# Patient Record
Sex: FEMALE | Race: WHITE | Employment: FULL TIME | URBAN - NONMETROPOLITAN AREA
[De-identification: names, ages, dates, MRNs, and addresses within clinical notes are randomized per-mention and may not be internally consistent; named-entity substitution may affect disease eponyms.]

---

## 2017-07-25 ENCOUNTER — HOSPITAL ENCOUNTER (EMERGENCY)
Age: 42
Discharge: HOME | End: 2017-07-25
Payer: COMMERCIAL

## 2017-07-25 VITALS
HEART RATE: 98 BPM | TEMPERATURE: 98.1 F | SYSTOLIC BLOOD PRESSURE: 127 MMHG | OXYGEN SATURATION: 98 % | BODY MASS INDEX: 32.96 KG/M2 | HEIGHT: 63 IN | DIASTOLIC BLOOD PRESSURE: 90 MMHG | RESPIRATION RATE: 22 BRPM | WEIGHT: 186 LBS

## 2017-07-25 DIAGNOSIS — S60.459A FOREIGN BODY IN SKIN OF FINGER, INITIAL ENCOUNTER: Primary | ICD-10-CM

## 2017-07-25 PROCEDURE — 99213 OFFICE O/P EST LOW 20 MIN: CPT | Performed by: NURSE PRACTITIONER

## 2017-07-25 PROCEDURE — 90471 IMMUNIZATION ADMIN: CPT | Performed by: NURSE PRACTITIONER

## 2017-07-25 PROCEDURE — 6360000002 HC RX W HCPCS: Performed by: NURSE PRACTITIONER

## 2017-07-25 PROCEDURE — 90715 TDAP VACCINE 7 YRS/> IM: CPT | Performed by: NURSE PRACTITIONER

## 2017-07-25 PROCEDURE — 99212 OFFICE O/P EST SF 10 MIN: CPT

## 2017-07-25 RX ADMIN — TETANUS TOXOID, REDUCED DIPHTHERIA TOXOID AND ACELLULAR PERTUSSIS VACCINE, ADSORBED 0.5 ML: 5; 2.5; 8; 8; 2.5 SUSPENSION INTRAMUSCULAR at 19:17

## 2017-07-25 ASSESSMENT — ENCOUNTER SYMPTOMS
ABDOMINAL PAIN: 0
BACK PAIN: 0
WHEEZING: 0
SORE THROAT: 0
COUGH: 0
SINUS PRESSURE: 0
COLOR CHANGE: 0
CHEST TIGHTNESS: 0
NAUSEA: 0
SHORTNESS OF BREATH: 0
RHINORRHEA: 0
VOMITING: 0

## 2021-03-24 ENCOUNTER — IMMUNIZATION (OUTPATIENT)
Dept: PRIMARY CARE CLINIC | Age: 46
End: 2021-03-24
Payer: COMMERCIAL

## 2021-03-24 PROCEDURE — 0001A PR IMM ADMN SARSCOV2 30MCG/0.3ML DIL RECON 1ST DOSE: CPT | Performed by: FAMILY MEDICINE

## 2021-03-24 PROCEDURE — 91300 COVID-19, PFIZER VACCINE 30MCG/0.3ML DOSE: CPT | Performed by: FAMILY MEDICINE

## 2021-04-14 ENCOUNTER — IMMUNIZATION (OUTPATIENT)
Dept: PRIMARY CARE CLINIC | Age: 46
End: 2021-04-14
Payer: COMMERCIAL

## 2021-04-14 PROCEDURE — 91300 COVID-19, PFIZER VACCINE 30MCG/0.3ML DOSE: CPT | Performed by: PHARMACIST

## 2021-04-14 PROCEDURE — 0002A COVID-19, PFIZER VACCINE 30MCG/0.3ML DOSE: CPT | Performed by: PHARMACIST

## 2021-11-10 ENCOUNTER — HOSPITAL ENCOUNTER (EMERGENCY)
Age: 46
Discharge: HOME | End: 2021-11-10
Payer: COMMERCIAL

## 2021-11-10 VITALS
HEART RATE: 84 BPM | RESPIRATION RATE: 16 BRPM | HEIGHT: 63 IN | TEMPERATURE: 96.8 F | BODY MASS INDEX: 36.32 KG/M2 | OXYGEN SATURATION: 100 % | WEIGHT: 205 LBS | DIASTOLIC BLOOD PRESSURE: 84 MMHG | SYSTOLIC BLOOD PRESSURE: 139 MMHG

## 2021-11-10 DIAGNOSIS — S39.012A LUMBOSACRAL STRAIN, INITIAL ENCOUNTER: Primary | ICD-10-CM

## 2021-11-10 PROCEDURE — 99202 OFFICE O/P NEW SF 15 MIN: CPT | Performed by: NURSE PRACTITIONER

## 2021-11-10 PROCEDURE — 99203 OFFICE O/P NEW LOW 30 MIN: CPT

## 2021-11-10 RX ORDER — METHYLPREDNISOLONE 4 MG/1
TABLET ORAL
Qty: 1 KIT | Refills: 0 | Status: SHIPPED | OUTPATIENT
Start: 2021-11-10 | End: 2021-11-16

## 2021-11-10 RX ORDER — CYCLOBENZAPRINE HCL 5 MG
5 TABLET ORAL 3 TIMES DAILY PRN
Qty: 21 TABLET | Refills: 0 | Status: SHIPPED | OUTPATIENT
Start: 2021-11-10 | End: 2021-11-20

## 2021-11-10 RX ORDER — NAPROXEN SODIUM 220 MG
220 TABLET ORAL 2 TIMES DAILY WITH MEALS
Qty: 60 TABLET | Refills: 3 | COMMUNITY
Start: 2021-11-10

## 2021-11-10 ASSESSMENT — ENCOUNTER SYMPTOMS
SHORTNESS OF BREATH: 0
BACK PAIN: 1

## 2021-11-10 ASSESSMENT — PAIN DESCRIPTION - ORIENTATION: ORIENTATION: LOWER

## 2021-11-10 ASSESSMENT — PAIN - FUNCTIONAL ASSESSMENT: PAIN_FUNCTIONAL_ASSESSMENT: PREVENTS OR INTERFERES SOME ACTIVE ACTIVITIES AND ADLS

## 2021-11-10 ASSESSMENT — PAIN DESCRIPTION - DESCRIPTORS: DESCRIPTORS: ACHING;SHARP

## 2021-11-10 ASSESSMENT — PAIN DESCRIPTION - LOCATION: LOCATION: BACK

## 2021-11-10 ASSESSMENT — PAIN SCALES - GENERAL: PAINLEVEL_OUTOF10: 8

## 2021-11-10 ASSESSMENT — PAIN DESCRIPTION - PAIN TYPE: TYPE: CHRONIC PAIN

## 2021-11-10 NOTE — ED TRIAGE NOTES
Patient ambulated to room with complaint of chronic lower back pain that flared up a couple of days ago.  States she was referred to pain management but has not scheduled with them yet

## 2021-11-10 NOTE — ED PROVIDER NOTES
[facility]       CHIEF COMPLAINT       Chief Complaint   Patient presents with    Back Pain     chronic lower back pain       Nurses Notes reviewed and I agree except as noted in the HPI. HISTORY OF PRESENT ILLNESS   Hieu Nicole is a 48 y.o. female who presents with complaints of lower back discomfort. Patient states this started a few days ago. She admits to a history of chronic lower back pain. She previously has seen a orthopedic specialist but was unhappy with his care. She was referred to pain management but has not followed. She denies any numbness or tingling or radiation of pain. She complains of severe discomfort across the top of her buttocks. She denies any urination or bowel problems. The history is provided by the patient. REVIEW OF SYSTEMS     Review of Systems   Constitutional: Negative for activity change. Respiratory: Negative for shortness of breath. Cardiovascular: Negative for chest pain. Genitourinary: Negative for difficulty urinating. Musculoskeletal: Positive for back pain and myalgias. Negative for arthralgias and gait problem. Skin: Negative for wound. Neurological: Negative for weakness and numbness. PAST MEDICAL HISTORY         Diagnosis Date    ADD (attention deficit disorder)     Depression     Fibromyalgia     Headache(784.0)     Hyperlipidemia     Hypertension     Obesity        SURGICALHISTORY     Patient  has a past surgical history that includes Hysterectomy. CURRENT MEDICATIONS       Discharge Medication List as of 11/10/2021 11:15 AM      CONTINUE these medications which have NOT CHANGED    Details   Topiramate (TOPAMAX PO) Take by mouthHistorical Med      NONFORMULARY Indications: cholesterol medication Historical Med      STRATTERA 40 MG capsule TAKE 1 CAPSULE DAILY, Disp-90 capsule, R-0Normal      hydrochlorothiazide (HYDRODIURIL) 12.5 MG tablet Take 1 tablet by mouth daily. , Disp-90 tablet, R-3Print      PREMARIN 1.25 MG tablet TAKE 1 TABLET BY MOUTH DAILY, Disp-90 tablet, R-3Normal      amitriptyline (ELAVIL) 25 MG tablet Take 1 tablet by mouth nightly., Disp-90 tablet, R-3Normal      Nutritional Supplements (MELATONIN PO) Take  by mouth nightly. Historical Med      albuterol (PROVENTIL HFA;VENTOLIN HFA) 108 (90 BASE) MCG/ACT inhaler Inhale 1 puff into the lungs every 4 hours as needed for Wheezing., Disp-1 Inhaler, R-0Normal             ALLERGIES     Patient is is allergic to bactrim [sulfamethoxazole-trimethoprim] and vicodin [hydrocodone-acetaminophen]. Patients   Immunization History   Administered Date(s) Administered    COVID-19, [employer], PF, 30mcg/0.3mL 03/24/2021, 04/14/2021    DTaP 1973, 1973, 1973, 10/09/1974, 06/08/1978    Influenza Virus Vaccine 10/24/2011    MMR 04/10/1974    Measles 1973    Mumps 06/08/1978    PPD Test 06/08/1978    Polio IPV (IPOL) 1973, 1973, 10/09/1974, 06/08/1978    Tdap (Boostrix, Adacel) 07/25/2017    Varicella (Varivax) 04/01/1976       FAMILY HISTORY     Patient's family history includes Cirrhosis in her father; Depression in her father; Diabetes in her father; Heart Disease in her father; High Blood Pressure in her father; High Cholesterol in her father and mother. SOCIAL HISTORY     Patient  reports that she quit smoking about 16 years ago. Her smoking use included cigarettes. She has a 11.00 pack-year smoking history. She has never used smokeless tobacco. She reports current alcohol use of about 5.0 standard drinks of alcohol per week. She reports that she does not use drugs. PHYSICAL EXAM     ED TRIAGE VITALS  BP: 139/84, Temp: 96.8 °F (36 °C), Pulse: 84, Resp: 16, SpO2: 100 %,Estimated body mass index is 36.31 kg/m² as calculated from the following:    Height as of this encounter: 5' 3\" (1.6 m). Weight as of this encounter: 205 lb (93 kg). ,No LMP recorded.  Patient has had a hysterectomy. Physical Exam  Vitals and nursing note reviewed. Constitutional:       General: She is not in acute distress. Appearance: She is ill-appearing. Cardiovascular:      Rate and Rhythm: Normal rate. Pulmonary:      Effort: Pulmonary effort is normal.   Abdominal:      Tenderness: There is no abdominal tenderness. There is no right CVA tenderness or left CVA tenderness. Musculoskeletal:         General: Tenderness present. Lumbar back: Spasms and tenderness present. No swelling, deformity or signs of trauma. Decreased range of motion. Skin:     General: Skin is warm and dry. Neurological:      Mental Status: She is alert and oriented to person, place, and time. DIAGNOSTIC RESULTS     Labs:No results found for this visit on 11/10/21. IMAGING:  None    EKG:  None    URGENT CARE COURSE:     Vitals:    11/10/21 1058 11/10/21 1101   BP:  139/84   Pulse: 84    Resp: 16    Temp: 96.8 °F (36 °C)    TempSrc: Temporal    SpO2: 100%    Weight: 205 lb (93 kg)    Height: 5' 3\" (1.6 m)        Medications - No data to display       PROCEDURES:  None    FINAL IMPRESSION      1. Lumbosacral strain, initial encounter      DISPOSITION/ PLAN   DISPOSITION Decision To Discharge 11/10/2021 11:12:12 AM     Clinical exam consistent with a lumbosacral strain. Advised patient to continue Aleve twice daily. She should apply heating pad 3 times a day. We will start her on Medrol Dosepak and Flexeril. Back stretches provided in written and verbal format. Increase oral fluid intake. Follow-up with PCP if worsens or fails to improve.     PATIENT REFERRED TO:  BRIAN Pascual - CNP  [facility] / [address] [address]      DISCHARGE MEDICATIONS:  Discharge Medication List as of 11/10/2021 11:15 AM      START taking these medications    Details   cyclobenzaprine (FLEXERIL) 5 MG tablet Take 1 tablet by mouth 3 times daily as needed for Muscle spasms, Disp-21 tablet, R-0Normal methylPREDNISolone (MEDROL, ADAM,) 4 MG tablet Take by mouth., Disp-1 kit, R-0Normal      naproxen sodium (ALEVE) 220 MG tablet Take 1 tablet by mouth 2 times daily (with meals), Disp-60 tablet, R-3OTC             Discharge Medication List as of 11/10/2021 11:15 AM          Discharge Medication List as of 11/10/2021 11:15 AM          BRIAN Stafford CNP    (Please note that portions of this note were completed with a voice recognition program. Efforts were made to edit the dictations but occasionally words are mis-transcribed.)            BRIAN Stafford CNP  11/10/21 1121

## 2021-11-10 NOTE — ED NOTES
Pt discharged. Pt verbalized understanding of discharge instructions and scripts. Pt walked out per self in stable condition.      Dimple Can LPN  41/12/29 2186

## 2022-02-02 ENCOUNTER — HOSPITAL ENCOUNTER (OUTPATIENT)
Age: 47
Discharge: HOME | End: 2022-02-02
Payer: COMMERCIAL

## 2022-02-02 ENCOUNTER — HOSPITAL ENCOUNTER (OUTPATIENT)
Dept: GENERAL RADIOLOGY | Age: 47
Discharge: HOME | End: 2022-02-02
Payer: COMMERCIAL

## 2022-02-02 DIAGNOSIS — M54.50 DORSALGIA OF LUMBOSACRAL REGION: ICD-10-CM

## 2022-02-02 PROCEDURE — 72100 X-RAY EXAM L-S SPINE 2/3 VWS: CPT

## 2022-02-09 ENCOUNTER — OFFICE VISIT (OUTPATIENT)
Dept: PHYSICAL MEDICINE AND REHAB | Age: 47
End: 2022-02-09
Payer: COMMERCIAL

## 2022-02-09 VITALS
HEIGHT: 63 IN | DIASTOLIC BLOOD PRESSURE: 70 MMHG | SYSTOLIC BLOOD PRESSURE: 124 MMHG | HEART RATE: 62 BPM | WEIGHT: 210 LBS | BODY MASS INDEX: 37.21 KG/M2

## 2022-02-09 DIAGNOSIS — M47.816 SPONDYLOSIS OF LUMBAR REGION WITHOUT MYELOPATHY OR RADICULOPATHY: Primary | ICD-10-CM

## 2022-02-09 DIAGNOSIS — G89.29 CHRONIC PAIN OF BOTH KNEES: ICD-10-CM

## 2022-02-09 DIAGNOSIS — M25.559 HIP PAIN: ICD-10-CM

## 2022-02-09 DIAGNOSIS — G89.4 CHRONIC PAIN SYNDROME: ICD-10-CM

## 2022-02-09 DIAGNOSIS — M25.561 CHRONIC PAIN OF BOTH KNEES: ICD-10-CM

## 2022-02-09 DIAGNOSIS — M25.562 CHRONIC PAIN OF BOTH KNEES: ICD-10-CM

## 2022-02-09 PROCEDURE — 99214 OFFICE O/P EST MOD 30 MIN: CPT | Performed by: NURSE PRACTITIONER

## 2022-02-09 RX ORDER — NAPROXEN 500 MG/1
500 TABLET ORAL 2 TIMES DAILY PRN
Qty: 60 TABLET | Refills: 3 | Status: SHIPPED | OUTPATIENT
Start: 2022-02-09 | End: 2022-06-27

## 2022-02-09 RX ORDER — TIZANIDINE 2 MG/1
2 TABLET ORAL 2 TIMES DAILY PRN
Qty: 60 TABLET | Refills: 1 | Status: SHIPPED | OUTPATIENT
Start: 2022-02-09 | End: 2022-03-11

## 2022-02-09 RX ORDER — ASPIRIN 81 MG/1
81 TABLET ORAL DAILY
COMMUNITY

## 2022-02-09 ASSESSMENT — ENCOUNTER SYMPTOMS
VOMITING: 0
SHORTNESS OF BREATH: 0
EYE PAIN: 0
BACK PAIN: 1
WHEEZING: 0
COUGH: 0
PHOTOPHOBIA: 0
COLOR CHANGE: 0
SORE THROAT: 0
CONSTIPATION: 0
NAUSEA: 0
SINUS PRESSURE: 0
CHEST TIGHTNESS: 0
RHINORRHEA: 0
DIARRHEA: 0
ABDOMINAL PAIN: 0

## 2022-02-09 NOTE — PROGRESS NOTES
[facility] [address]  Dept: [phone]  Dept Fax: [phone]  Loc: [phone]    Visit Date: 2/9/2022    Diogo Clinton is a 48 y.o. female who is referred for pain management evaluation and treatment per Dr. Reina Acevedo. CAGE and CAGE-AID Questions   1. In the last three months, have you felt you should cut down or stop drinking or using drugs? Yes []        No [x]     2. In the last three months, has anyone annoyed you or gotten on your nerves by telling you to cut down or stop drinking or using drugs? Yes []        No [x]     3. In the last three months, have you felt guilty or bad about how much you drink or use drugs? Yes []        No [x]     4. In the last three months, have you been waking up wanting to have an alcoholic drink or use drugs? Yes []        No [x]        Opioid Risk Tool:  Clinician Form       1. Family History of Substance Abuse: Female Male    Alcohol   []1   []3    Illegal drugs   []2   []3    Prescription drugs     []4   []4   2. Personal History of Substance Abuse:          Alcohol   []3   []3    Illegal drugs   []4   []4    Prescription drugs     []5   []5   3. Age (aditya box if between 12 and 39):     []1   []1   4. History of Preadolescent Sexual Abuse:     []3   []0   5. Psychological Disease:      Attention deficit disorder, obsessive-compulsive disorder, bipolar, schizophrenia   [x]2   []2      Depression     []1   []1    Scoring Totals 2      Total Score  Low Risk  Moderate Risk  High Risk   Risk Category   0 - 3   4 - 7   8 or Above      Patient states symptoms interfere with:  A.  General Activity:  yes   B. Mood: yes    C. Walking Ability:   yes   D. Normal Work (Includes both work outside the home and housework):   yes    E.  Relations with Other People:  yes   F. Sleep:   yes   G.  Enjoyment of Life:  yes     HPI: ChiefComplaint: Low back pain and SI pain    HPI   New pt here for low back pain . Has had lumbar pain for many years. She works for [employer] as . On her feet a lot with bending heavy lifting standing at bench. She has seen Dr Melisa Sherman in past, She did not care for his care and never went back. She denies any trauma  fall or MVC. She has had Endometriosis with hysterectomy with weight gain but had back pain prior to that. She has constant lumbar pain  radiates into Hip SI and down RLE stops above the knee. She was taking Baclofen interacted with Topamax for migraines, Mobic she stopped because she couldn't take Advil if Zomig didn't work for her migraines. She complains of hip and knee pain also. Patient pain increases with bending, lifting, twisting , turning torso, standing and housework or working at job. Treatments tried ice, heat, Chiropractor, NSAIDS, muscle relaxer, OTC rubs creams patches and TENS  Pain description sharp and aching  Pain rating  scale 1-10 highest  7  lowest  3  average   5   Have you ever seen a Rheumatologist? Yes or no. Rheumatology screening labs CRP Sed Rate RH LETY ordered yes or no:  · PT: No,   · Spine surgeon consult: Yes has seen Dr Melisa Sherman  · Any Implants: No  Denies any bowel or bladder incontinence       . Partial sacralization of L5 with a pseudoarthrosis on the left. Moderate degenerative facet arthropathy L4-5 bilaterally. Mild disc space narrowing L4-5.   2. Otherwise normal lumbar spine. No fracture. Sacroiliac joints unremarkable.             The patient is allergic to bactrim [sulfamethoxazole-trimethoprim] and vicodin [hydrocodone-acetaminophen]. Subjective:      Review of Systems   Constitutional: Positive for activity change. Negative for appetite change, chills, diaphoresis, fatigue, fever and unexpected weight change. HENT: Negative for congestion, ear pain, hearing loss, mouth sores, nosebleeds, rhinorrhea, sinus pressure and sore throat. Eyes: Negative for photophobia, pain and visual disturbance. Respiratory: Negative for cough, chest tightness, shortness of breath and wheezing. ASTHMA   Cardiovascular: Negative for chest pain and palpitations. HTN HLD   Gastrointestinal: Negative for abdominal pain, constipation, diarrhea, nausea and vomiting. Endocrine: Negative for cold intolerance, heat intolerance, polydipsia, polyphagia and polyuria. Genitourinary: Negative for decreased urine volume, difficulty urinating, frequency and hematuria. Musculoskeletal: Positive for arthralgias, back pain, gait problem and myalgias. Negative for joint swelling, neck pain and neck stiffness. Skin: Negative for color change and rash. Allergic/Immunologic: Negative for food allergies and immunocompromised state. Neurological: Positive for headaches. Negative for dizziness, tremors, seizures, syncope, facial asymmetry, speech difficulty, weakness, light-headedness and numbness. Has Migraines [facility]   Hematological: Does not bruise/bleed easily. Psychiatric/Behavioral: Negative for agitation, behavioral problems, confusion, decreased concentration, dysphoric mood, hallucinations, self-injury, sleep disturbance and suicidal ideas. The patient is nervous/anxious and is hyperactive. ADHD       Objective:     Vitals:    02/09/22 0958   BP: 124/70   Site: Right Upper Arm   Position: Sitting   Cuff Size: Large Adult   Pulse: 62   Weight: 210 lb (95.3 kg)   Height: 5' 3\" (1.6 m)       Physical Exam  Vitals and nursing note reviewed. Constitutional:       General: She is not in acute distress. Appearance: She is well-developed. She is not diaphoretic. HENT:      Head: Normocephalic and atraumatic. Right Ear: External ear normal.      Left Ear: External ear normal.      Nose: Nose normal.      Mouth/Throat:      Pharynx: No oropharyngeal exudate. Eyes:      General: No scleral icterus.         Right eye: No discharge. Left eye: No discharge. Conjunctiva/sclera: Conjunctivae normal.      Pupils: Pupils are equal, round, and reactive to light. Neck:      Thyroid: No thyromegaly. Cardiovascular:      Rate and Rhythm: Normal rate and regular rhythm. Heart sounds: Normal heart sounds. No murmur heard. No friction rub. No gallop. Pulmonary:      Effort: Pulmonary effort is normal. No respiratory distress. Breath sounds: Normal breath sounds. No wheezing or rales. Chest:      Chest wall: No tenderness. Abdominal:      General: Bowel sounds are normal. There is no distension. Palpations: Abdomen is soft. Tenderness: There is no abdominal tenderness. There is no guarding or rebound. Musculoskeletal:      Right elbow: Decreased range of motion. Tenderness present. Cervical back: Full passive range of motion without pain, normal range of motion and neck supple. No edema, erythema or rigidity. No muscular tenderness. Normal range of motion. Thoracic back: Spasms, tenderness and bony tenderness present. Decreased range of motion. Lumbar back: Spasms, tenderness and bony tenderness present. Decreased range of motion. Back:       Right hip: Tenderness and bony tenderness present. Left hip: Tenderness and bony tenderness present. Right knee: Bony tenderness and crepitus present. Decreased range of motion. Tenderness present. Left knee: Bony tenderness and crepitus present. Decreased range of motion. Tenderness present. Skin:     General: Skin is warm. Coloration: Skin is not pale. Findings: No erythema or rash. Neurological:      Mental Status: She is alert and oriented to person, place, and time. She is not disoriented. Cranial Nerves: No cranial nerve deficit. Sensory: No sensory deficit. Motor: No atrophy or abnormal muscle tone.       Coordination: Coordination normal.      Gait: Gait normal.      Deep Tendon Reflexes: Reflexes are normal and symmetric. Babinski sign absent on the right side. Psychiatric:         Attention and Perception: Attention and perception normal. She is attentive. Mood and Affect: Mood and affect normal. Mood is not anxious or depressed. Affect is not labile, blunt, angry or inappropriate. Speech: Speech normal. She is communicative. Speech is not rapid and pressured, delayed, slurred or tangential.         Behavior: Behavior normal. Behavior is not agitated, slowed, aggressive, withdrawn, hyperactive or combative. Behavior is cooperative. Thought Content: Thought content normal. Thought content is not paranoid or delusional. Thought content does not include homicidal or suicidal ideation. Thought content does not include homicidal or suicidal plan. Cognition and Memory: Cognition and memory normal. Memory is not impaired. She does not exhibit impaired recent memory or impaired remote memory. Judgment: Judgment normal. Judgment is not impulsive or inappropriate. GURWINDER  Patricks test  positive  Yeoman's  positive  Gaenslen's  positive  Kemps  positive       Assessment:     1. Spondylosis of lumbar region without myelopathy or radiculopathy    2. Chronic pain syndrome    3. Hip pain    4. Chronic pain of both knees            Plan:      · Patient read and signed orientation and opioid agreement. · OARRS reviewed. Current MED: 0  · Patient was not offered naloxone for home. · Discussed long term side effects of medications, tolerance, dependency and addiction. · UDS preformed today. · Patient told can not receive any pain medications from any other source. · No evidence of abuse, diversion or aberrant behavior.   · Prescription Needs: No prescription pain medications at this time   Medications and/or procedures to improve function and quality of life- patient understanding with this and that may not be pain free   Discussed possible weaning of medication dosing dependent on treatment/procedure results.  Discussed with patient about safe storage of medications at home   Testing: reviewed Lumbar XR   Hip knee XRs ordered   Labs: Rheum panel screening    Procedures: none until PT   Discussed with patient about risks with procedure including infection, reaction to medication, increased pain, or bleeding.  Medications:Naproxen BID PRN Zanaflex 2 mg BID PRN  Medications reviewed   PT ordered        Meds. Prescribed:   Orders Placed This Encounter   Medications    tiZANidine (ZANAFLEX) 2 MG tablet     Sig: Take 1 tablet by mouth 2 times daily as needed (spasms)     Dispense:  60 tablet     Refill:  1    naproxen (EC NAPROSYN) 500 MG EC tablet     Sig: Take 1 tablet by mouth 2 times daily as needed for Pain     Dispense:  60 tablet     Refill:  3       Return in about 3 months (around 5/9/2022) for F/U PT.          Electronically signed by BRIAN Romero CNP on 2/9/2022 at 10:41 AM

## 2022-02-18 ENCOUNTER — HOSPITAL ENCOUNTER (OUTPATIENT)
Dept: PHYSICAL THERAPY | Age: 47
Setting detail: THERAPIES SERIES
Discharge: HOME | End: 2022-02-18
Payer: COMMERCIAL

## 2022-02-18 PROCEDURE — 97161 PT EVAL LOW COMPLEX 20 MIN: CPT

## 2022-02-18 PROCEDURE — 97110 THERAPEUTIC EXERCISES: CPT

## 2022-02-18 NOTE — PROGRESS NOTES
** PLEASE SIGN, DATE AND TIME CERTIFICATION BELOW AND RETURN TO [facility] OUTPATIENT REHABILITATION (FAX #: [phone]). ATTEST/CO-SIGN IF ACCESSING VIA [employer]. THANK YOU.**    I certify that I have examined the patient below and determined that Physical Medicine and Rehabilitation service is necessary and that I approve the established plan of care for up to 90 days or as specifically noted. Attestation, signature or co-signature of physician indicates approval of certification requirements.    ________________________ ____________ __________  Physician Signature   Date   Time    [address] [facility]  PHYSICAL THERAPY  [x] EVALUATION  [] DAILY NOTE (LAND) [] DAILY NOTE (AQUATIC ) [] PROGRESS NOTE [] DISCHARGE NOTE    [x] [facility]   [] [place] [address]    [] [facility]   [] Charlotte Carson    Date: 2022  Patient Name:  Taylor Martinez  : 1975  MRN: [id number]  CSN: [id number]    Referring Practitioner BRIAN Hawkins - *   Diagnosis Spondylosis without myelopathy or radiculopathy, lumbar region [M47.816]   G89.4 (ICD-10-CM) - Chronic pain syndrome   M25.559 (ICD-10-CM) - Hip pain   M25.561, M25.562, G89.29 (ICD-10-CM) - Chronic pain of both knees      Treatment Diagnosis Lumbago, spinal stiffness, B SI pain, B hip stiffness, muscular weakness, abnormal posture   Date of Evaluation 22    Additional Pertinent History Chronic neck and elbow pain, chronic back pain, depression, fibromyalgia      Functional Outcome Measure Used Modified Oswestry LBP questionnaire    Functional Outcome Score 50 or 36% impaired (22)       Insurance: Primary: Payor: Willard Falcon [employer] /  /  / ,   Secondary:    Authorization Information: 20% copay.  No visit limit, no ionto, no heat/cold    Visit # 1, 1/10 for progress note   Visits Allowed: unlimited   Recertification Date:    Physician Follow-Up: 2022   Physician Orders:    History of Present Illness: Chronic back pain, sees chiropractor for SI adjustments which causes muscle spasms. Tried muscle relaxers but it reacted poorly with her migraine medicine, made her dizzy. She has tried heat/ice, TENS, stretching while laying. Poor tolerance to massage. SUBJECTIVE: C/o lower back pain, R foot tingling in great toe and ball of the foot. She is often tripping or dragging her feet. Her job involves lots of standing, and very difficult to  static positions. Patient arrived 15 mins after start of eval, short session today and limited assessment. Social/Functional History and Current Status:  Medications and Allergies have been reviewed and are listed on Medical History Questionnaire. Samy Thompson lives alone in a single story home with a level surface to enter. Task Previous Current   ADLs  Independent Independent   IADL's Independent Independent   Ambulation Independent Independent   Transfers Independent Independent   Recreation Independent Assistance Required - walking outdoors, birdwatching, walking a few miles    "[employer]   Work [employer]. Occupation:  - lots of standing still, climbing stairs occasionally. 8 hour days  Full-Time.        Objective:    OBSERVATION   Pain 4/10 lower back, RLE radicular tingling    Palpation    Sensation Right foot tingling    Edema    Spinal Accessory Motion    Bed Mobility IND    Transfers IND   Ambulation IND no device   Stairs    Balance 5 sec either SLS       POSTURE    No Deficit Deficit Comments   Forward Head      Rounded Shoulders      Kyphosis      Lordosis  x    Lateral Shift      Scoliosis      Iliac Crest      PSIS      ASIS      Leg Length Discrp x  Leg lengths equal supine   R knee elevated in hooklying   Slumped Sitting          TRUNK RANGE OF MOTION   Flexion: 0-50 deg   Extension: 0 deg    Lateral Flexion Left:    Lateral Flexion Right: Rotation Left:    Rotation Right:    Trunk Range of Motion is [facility]  []     LOWER EXTREMITY RANGE OF MOTION    Left Right Comments   Hip Flexion knee to chest 0-120 0-120    Hip Extension 0-20 0-20    Hip ABDuction      Hip ADDuction      Hip Internal Rotation      Hip External Rotation GURWINDER 0-110 GURWINDER 0-110    Hip Range of Motion is [facility]  []      Knee Flexion      Knee Extension      Knee Range of Motion is [facility]  []       LOWER EXTREMITY STRENGTH    Left Right Comments   Hip Flexion 4+/5 4+/5    Hip Abduction 4/5 4/5    Hip Adduction 5/5 5/5    Hip Extension      Hip External Rotation 4/5 4/5    Knee Flexion      Knee Extension      Ankle DF      Ankle PF      LE strength is [facility] []        SPECIAL TESTS (+/-)    Left Right Comments   SLR  neg neg    90/90 Hamstring length      SKTC      DKTC      Slump      SI Compression/Distraction neg neg    GURWINDER pos pos tight   FADIR      Mikhail      Kailash pos pos tight   Ely            TREATMENT   Precautions: L5/S1 congenital fusion   Pain: R foot tingling, Lower back pain, B SI pain    X in shaded column indicates activity completed today   Modalities Parameters/  Location  Notes                     Manual Therapy Time/Technique  Notes                     Exercise/Intervention   Notes   Hooklying posterior pelvic tilt 5x 5 sec x    Piriformis stretch - leg crossed pulling knee 3x 30 sec x    Hip flexor stretch supine off edge 3x 30 sec x    Longitudinal hip ER/IR    Given for HEP   Bridge 5x 5 sec x    sidelying clam    Given for HEP                                        Specific Interventions Next Treatment: NuStep warm up, follow with SI and lumbar stabilization (posterior tilt, marching, bridges, clams, reverse clam, SLR), hip stretches (hamstring, piriformis, hip flexor), deep pressure inhibition to R piriformis or possibly benefit from dry needling for muscle spasms and radicular R foot pain    Activity/Treatment Tolerance:  [x]  Patient tolerated treatment well  [] Patient limited by fatigue  []  Patient limited by pain   []  Patient limited by medical complications  []  Other:     Assessment: Patient presents with chronic lower back pain and SI pain consistent with physician diagnosis. 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 She also notes R foot tingling and weakness affecting her balance and RLE strength. She will benefit from PT to decrease pain, improve flexibility and core strength so she can work full time, walk for exercise, and live alone independently. Body Structures/Functions/Activity Limitations: impaired activity tolerance, impaired balance, impaired ROM, impaired sensation, impaired strength, pain, abnormal gait and abnormal posture  Prognosis: good    GOALS:  Patient Goal: decrease back pain     Short Term Goals:  Time Frame: 4 weeks    Patient will report decreased lower back and RLE radicular tingling pain from baseline 5/10 to less than 2/10 during therapy exercises in order to  one position for up to 10 mins for work. Patient will improve BLE PROM to 0-80 deg hamstring, 0-120 deg GURWINDER, and 0-30 deg hip extension in order to bend and lift without straining lumbar spine. Patient will demonstrate good abdominal bracing and body mechanics during therapy session in order to preserve neutral spine during household tasks. Patient will be IND with HEP in order to meet long term goals. Long Term Goals:  Time Frame: 8 weeks    Patient will improve score of Modified Oswestry LBP questionnaire from baseline 36% impaired to less than 25% in order to lift heavy items for household tasks and stand longer durations. Patient will squat to lift 20 lb weight from floor to standing with good body mechanics in order to perform household tasks and carry groceries with ease.      Patient will improve BLE strength to 5/5 hips and knees in order to squat, lunge, and navigate steps without difficulty. Patient Education:   [x]  HEP/Education Completed: Plan of Care, Goals, HEP handout provided for hip stretching, posterior tilt, clamshell    [employer] Access Code: [address]  []  No new Education completed  []  Reviewed Prior HEP      [x]  Patient verbalized and/or demonstrated understanding of education provided. []  Patient unable to verbalize and/or demonstrate understanding of education provided. Will continue education. []  Barriers to learning:     PLAN:  Treatment Recommendations: Strengthening, Range of Motion, Balance Training, Functional Mobility Training, Manual Therapy - Soft Tissue Mobilization, Manual Therapy - Joint Manipulation, Pain Management, Home Exercise Program, Integrative Dry Needling, Aquatics and Modalities    [x]  Plan of care initiated. Plan to see patient 2 times per week for 8 weeks to address the treatment planned outlined above.   []  Continue with current plan of care  []  Modify plan of care as follows:    []  Hold pending physician visit  []  Discharge    Time In 66 [phone] 21 (late)   Time Out 1620   Timed Code Minutes: 15 min   Total Treatment Time: 40 min     Electronically Signed by: Jeanne Champagne PT

## 2022-02-25 ENCOUNTER — HOSPITAL ENCOUNTER (OUTPATIENT)
Dept: PHYSICAL THERAPY | Age: 47
Setting detail: THERAPIES SERIES
Discharge: HOME | End: 2022-02-25
Payer: COMMERCIAL

## 2022-02-25 PROCEDURE — 97110 THERAPEUTIC EXERCISES: CPT

## 2022-02-25 NOTE — PROGRESS NOTES
[address] [facility]  PHYSICAL THERAPY  [] EVALUATION  [x] DAILY NOTE (LAND) [] DAILY NOTE (AQUATIC ) [] PROGRESS NOTE [] DISCHARGE NOTE    [x] OUTPATIENT REHABILITATION CENTER [facility]   [] [place] [address]    [] [facility]   [] Erika Pina    Date: 2022  Patient Name:  Cristhian Mcarthur  : 1975  MRN: [id number]  CSN: [id number]    Referring Practitioner BRIAN Hurtado - *   Diagnosis Spondylosis without myelopathy or radiculopathy, lumbar region [M47.816]   G89.4 (ICD-10-CM) - Chronic pain syndrome   M25.559 (ICD-10-CM) - Hip pain   M25.561, M25.562, G89.29 (ICD-10-CM) - Chronic pain of both knees      Treatment Diagnosis Lumbago, spinal stiffness, B SI pain, B hip stiffness, muscular weakness, abnormal posture   Date of Evaluation 22    Additional Pertinent History Chronic neck and elbow pain, chronic back pain, depression, fibromyalgia      Functional Outcome Measure Used Modified Oswestry LBP questionnaire    Functional Outcome Score 18/50 or 36% impaired (22)       Insurance: Primary: Payor: Dean Palomino /  /  / ,   Secondary:    Authorization Information: 20% copay. No visit limit, no ionto, no heat/cold    Visit # 2, 2/10 for progress note   Visits Allowed: unlimited   Recertification Date: 1055   Physician Follow-Up: 2022   Physician Orders:    History of Present Illness: Chronic back pain, sees chiropractor for SI adjustments which causes muscle spasms. Tried muscle relaxers but it reacted poorly with her migraine medicine, made her dizzy. She has tried heat/ice, TENS, stretching while laying. Poor tolerance to massage. SUBJECTIVE: Patient reports that she was sore after her evaluation. She states that she has been working on some of the exercises at home. She rates her lower back pain and right hip pain a 3/10. Patient also continues to have tingling down to her right foot.  She states that the tingling is random and when she has it lasts for about 30 seconds to a minute and then goes away. Objective:  TREATMENT   Precautions: L5/S1 congenital fusion   Pain: R foot tingling, 3/10 Lower back pain, R hip pain    X in shaded column indicates activity completed today   Modalities Parameters/  Location  Notes                     Manual Therapy Time/Technique  Notes                     Exercise/Intervention   Notes   Hooklying posterior pelvic tilt 10x 5 sec X    Piriformis stretch - leg crossed pulling knee 3x 30 sec X    Right hip flexor stretch supine off edge 3x 30 sec X Did not perform at LLE today as patient did not feel stretch at this side   Hamstring stretch ** 3x 30 sec  X    Longitudinal hip ER/IR 10x 2-3 sec  X    Bridge 2x5 5 sec X Cues to perform in pain free range   PPT with march ** 10x  X    PPT with hip adduction ** 10x 5 sec  X    PPT with bent knee fall out ** 10x 2-3 sec  X    PPT with SLR** 5x B  X Performed with small range today to avoid right hip agitation. Sidelying clamshells  10x B   X    Sidelying reverse clamshells ** 5x B   X                                  Specific Interventions Next Treatment: NuStep warm up, follow with SI and lumbar stabilization (posterior tilt, marching, bridges, clams, reverse clam, SLR), hip stretches (hamstring, piriformis, hip flexor), deep pressure inhibition to R piriformis or possibly benefit from dry needling for muscle spasms and radicular R foot pain    Activity/Treatment Tolerance:  [x]  Patient tolerated treatment well  []  Patient limited by fatigue  []  Patient limited by pain   []  Patient limited by medical complications  []  Other:     Assessment: Added therapeutic exercises as documented above and indicated by **. Patient was provided with demonstration and cues on proper technique with added exercises to ensure maximal muscle activation. She tolerated treatment session well. She reported that her pain remained a 3/10 at the conclusion of session.      Body Structures/Functions/Activity Limitations: impaired activity tolerance, impaired balance, impaired ROM, impaired sensation, impaired strength, pain, abnormal gait and abnormal posture  Prognosis: good    GOALS:  Patient Goal: decrease back pain     Short Term Goals:  Time Frame: 4 weeks    Patient will report decreased lower back and RLE radicular tingling pain from baseline 5/10 to less than 2/10 during therapy exercises in order to  one position for up to 10 mins for work. Patient will improve BLE PROM to 0-80 deg hamstring, 0-120 deg GURWINDER, and 0-30 deg hip extension in order to bend and lift without straining lumbar spine. Patient will demonstrate good abdominal bracing and body mechanics during therapy session in order to preserve neutral spine during household tasks. Patient will be IND with HEP in order to meet long term goals. Long Term Goals:  Time Frame: 8 weeks    Patient will improve score of Modified Oswestry LBP questionnaire from baseline 36% impaired to less than 25% in order to lift heavy items for household tasks and stand longer durations. Patient will squat to lift 20 lb weight from floor to standing with good body mechanics in order to perform household tasks and carry groceries with ease. Patient will improve BLE strength to 5/5 hips and knees in order to squat, lunge, and navigate steps without difficulty. Patient Education:   [x]  HEP/Education Completed: Added exercises as documented above. Correct technique with exercises. Patient provided with updated HEP handouts. Monitor response to progressions made. Use of heat or ice to assist with soreness.  [facility] Access Code: [address] [facility]  []  No new Education completed  [x]  Reviewed Prior HEP      [x]  Patient verbalized and/or demonstrated understanding of education provided. []  Patient unable to verbalize and/or demonstrate understanding of education provided. Will continue education.   []  Barriers to learning:     PLAN:  Treatment Recommendations: Strengthening, Range of Motion, Balance Training, Functional Mobility Training, Manual Therapy - Soft Tissue Mobilization, Manual Therapy - Joint Manipulation, Pain Management, Home Exercise Program, Integrative Dry Needling, Aquatics and Modalities    []  Plan of care initiated. Plan to see patient 2 times per week for 8 weeks to address the treatment planned outlined above.   [x]  Continue with current plan of care  []  Modify plan of care as follows:    []  Hold pending physician visit  []  Discharge    Time In 1431   Time Out 1513   Timed Code Minutes: 42 min   Total Treatment Time: 42 min     Electronically Signed by: Tu Varela PTA

## 2022-03-02 ENCOUNTER — HOSPITAL ENCOUNTER (OUTPATIENT)
Dept: PHYSICAL THERAPY | Age: 47
Setting detail: THERAPIES SERIES
Discharge: HOME | End: 2022-03-02
Payer: COMMERCIAL

## 2022-03-02 PROCEDURE — 97110 THERAPEUTIC EXERCISES: CPT

## 2022-03-02 NOTE — PROGRESS NOTES
[address] [facility]  PHYSICAL THERAPY  [] EVALUATION  [x] DAILY NOTE (LAND) [] DAILY NOTE (AQUATIC ) [] PROGRESS NOTE [] DISCHARGE NOTE    [x] OUTPATIENT REHABILITATION CENTER - [place]   [] [place] [address]    [] [facility]   [] Jaquan Rasheed    Date: 3/2/2022  Patient Name:  Leslie Neumann  : 1975  MRN: [id number]  CSN: [id number]    Referring Practitioner BRIAN Shah - *   Diagnosis Spondylosis without myelopathy or radiculopathy, lumbar region [M47.816]   G89.4 (ICD-10-CM) - Chronic pain syndrome   M25.559 (ICD-10-CM) - Hip pain   M25.561, M25.562, G89.29 (ICD-10-CM) - Chronic pain of both knees      Treatment Diagnosis Lumbago, spinal stiffness, B SI pain, B hip stiffness, muscular weakness, abnormal posture   Date of Evaluation 22    Additional Pertinent History Chronic neck and elbow pain, chronic back pain, depression, fibromyalgia      Functional Outcome Measure Used Modified Oswestry LBP questionnaire    Functional Outcome Score 18/50 or 36% impaired (22)       Insurance: Primary: Payor: [employer] /  /  / ,   Secondary:    Authorization Information: 20% copay. No visit limit, no ionto, no heat/cold    Visit # 3, 3/10 for progress note   Visits Allowed: unlimited   Recertification Date:    Physician Follow-Up: 2022   Physician Orders:    History of Present Illness: Chronic back pain, sees chiropractor for SI adjustments which causes muscle spasms. Tried muscle relaxers but it reacted poorly with her migraine medicine, made her dizzy. She has tried heat/ice, TENS, stretching while laying. Poor tolerance to massage. SUBJECTIVE: Patient states she was sore after last therapy session and she thought it was from rotating her straight leg in and out. Reports pain and soreness is deep in the hip joint.  Patient vocalized compliance with HEP    Objective:  TREATMENT   Precautions: L5/S1 congenital fusion   Pain: 4/10 Right hip, Lower back;  Minimal R foot tingling    X in shaded column indicates activity completed today   Modalities Parameters/  Location  Notes                     Manual Therapy Time/Technique  Notes                     Exercise/Intervention   Notes   Hooklying posterior pelvic tilt 10x 5 sec X    Piriformis stretch - leg crossed pulling knee 3x 30 sec X    Right hip flexor stretch supine off edge 3x 30 sec  Did not perform at LLE today as patient did not feel stretch at this side   Hamstring stretch  3x 30 sec  X    Longitudinal hip ER/IR 10x 2-3 sec      Bridge 2x5 5 sec X Cues to perform in pain free range   PPT with march  10x  X    PPT with hip adduction ball squeeze 10x 5 sec  X    PPT with bent knee fall out (bilateral and unilateral) 10x 2-3 sec  X    PPT with SLR** 5x B  X Performed with small range today to avoid right hip agitation. Sidelying clamshells  10x B   X    Sidelying reverse clamshells ** 5x B   X                                  Specific Interventions Next Treatment: NuStep warm up, follow with SI and lumbar stabilization (posterior tilt, marching, bridges, clams, reverse clam, SLR), hip stretches (hamstring, piriformis, hip flexor), deep pressure inhibition to R piriformis or possibly benefit from dry needling for muscle spasms and radicular R foot pain    Activity/Treatment Tolerance:  [x]  Patient tolerated treatment well  []  Patient limited by fatigue  []  Patient limited by pain   []  Patient limited by medical complications  []  Other:     Assessment: Patient tolerated activities well today. Held longitudinal hip IR/ER and reverse clamshell today due to patient having increased Right hip pain after completion last therapy session. Cues provided throughout session to ensure maximal muscle engagement. Patient rated pain 3/10 at end of session.      Body Structures/Functions/Activity Limitations: impaired activity tolerance, impaired balance, impaired ROM, impaired sensation, impaired strength, pain, abnormal gait and abnormal posture  Prognosis: good    GOALS:  Patient Goal: decrease back pain     Short Term Goals:  Time Frame: 4 weeks    Patient will report decreased lower back and RLE radicular tingling pain from baseline 5/10 to less than 2/10 during therapy exercises in order to  one position for up to 10 mins for work. Patient will improve BLE PROM to 0-80 deg hamstring, 0-120 deg GURWINDER, and 0-30 deg hip extension in order to bend and lift without straining lumbar spine. Patient will demonstrate good abdominal bracing and body mechanics during therapy session in order to preserve neutral spine during household tasks. Patient will be IND with HEP in order to meet long term goals. Long Term Goals:  Time Frame: 8 weeks    Patient will improve score of Modified Oswestry LBP questionnaire from baseline 36% impaired to less than 25% in order to lift heavy items for household tasks and stand longer durations. Patient will squat to lift 20 lb weight from floor to standing with good body mechanics in order to perform household tasks and carry groceries with ease. Patient will improve BLE strength to 5/5 hips and knees in order to squat, lunge, and navigate steps without difficulty. Patient Education:   [x]  HEP/Education Completed:  Correct technique with exercises. Monitor response to progressions made. Use of heat or ice to assist with soreness.  [employer] Access Code: [facility]  []  No new Education completed  [x]  Reviewed Prior HEP      [x]  Patient verbalized and/or demonstrated understanding of education provided. []  Patient unable to verbalize and/or demonstrate understanding of education provided. Will continue education.   []  Barriers to learning:     PLAN:  Treatment Recommendations: Strengthening, Range of Motion, Balance Training, Functional Mobility Training, Manual Therapy - Soft Tissue Mobilization, Manual Therapy - Joint Manipulation, Pain Management, Home Exercise Program, Integrative Dry Needling, Aquatics and Modalities    []  Plan of care initiated. Plan to see patient 2 times per week for 8 weeks to address the treatment planned outlined above.   [x]  Continue with current plan of care  []  Modify plan of care as follows:    []  Hold pending physician visit  []  Discharge    Time In 1545   Time Out 1615   Timed Code Minutes: 30 min   Total Treatment Time: 30 min     Electronically Signed by: Gwendolyn Hernandez PTA

## 2022-03-04 ENCOUNTER — APPOINTMENT (OUTPATIENT)
Dept: PHYSICAL THERAPY | Age: 47
End: 2022-03-04
Payer: COMMERCIAL

## 2022-03-09 ENCOUNTER — HOSPITAL ENCOUNTER (OUTPATIENT)
Dept: PHYSICAL THERAPY | Age: 47
Setting detail: THERAPIES SERIES
Discharge: HOME | End: 2022-03-09
Payer: COMMERCIAL

## 2022-03-09 PROCEDURE — 97140 MANUAL THERAPY 1/> REGIONS: CPT

## 2022-03-09 PROCEDURE — 97110 THERAPEUTIC EXERCISES: CPT

## 2022-03-09 NOTE — PROGRESS NOTES
[facility]  PHYSICAL THERAPY  [] EVALUATION  [x] DAILY NOTE (LAND) [] DAILY NOTE (AQUATIC ) [] PROGRESS NOTE [] DISCHARGE NOTE    [x] OUTPATIENT REHABILITATION CENTER [facility]   [] [place] [address]    [] [facility]   [] Milana Primrose    Date: 3/9/2022  Patient Name:  Jenny Albright  : 1975  MRN: [id number]  CSN: [id number]    Referring Practitioner BRIAN Tabor - *   Diagnosis Spondylosis without myelopathy or radiculopathy, lumbar region [M47.816]   G89.4 (ICD-10-CM) - Chronic pain syndrome   M25.559 (ICD-10-CM) - Hip pain   M25.561, M25.562, G89.29 (ICD-10-CM) - Chronic pain of both knees      Treatment Diagnosis Lumbago, spinal stiffness, B SI pain, B hip stiffness, muscular weakness, abnormal posture   Date of Evaluation 22    Additional Pertinent History Chronic neck and elbow pain, chronic back pain, depression, fibromyalgia      Functional Outcome Measure Used Modified Oswestry LBP questionnaire    Functional Outcome Score 18/50 or 36% impaired (22)       Insurance: Primary: Payor: Colletta Penna /  /  / ,   Secondary:    Authorization Information: 20% copay. No visit limit, no ionto, no heat/cold    Visit # 4, 4/10 for progress note   Visits Allowed: unlimited   Recertification Date: 0/15/0538   Physician Follow-Up: 2022   Physician Orders:    History of Present Illness: Chronic back pain, sees chiropractor for SI adjustments which causes muscle spasms. Tried muscle relaxers but it reacted poorly with her migraine medicine, made her dizzy. She has tried heat/ice, TENS, stretching while laying. Poor tolerance to massage. SUBJECTIVE: Patient feels soreness in her core muscles with therapy exercises but still getting sharp R hip pains in her groin, very limited pivoting mobility. Objective:  TREATMENT   Precautions: L5/S1 congenital fusion   Pain: 4/10 Right hip, Lower back;   Minimal R foot tingling    X in shaded column indicates activity completed today   Modalities Parameters/  Location  Notes                     Manual Therapy Time/Technique  Notes   R hip mobilizations with belt - distraction paired with passive GURWINDER stretch       Observation - RLE appearing longer than LLE supine and hooklying Left knee higher      Right SI MET - Right isometric hip extension       Exercise/Intervention   Notes   NuStep warm up Seat 7 arms 8 5 mins L3 X            Hooklying posterior pelvic tilt 10x 5 sec X    Piriformis stretch - leg crossed pulling knee 3x 30 sec X    Right hip flexor stretch supine off edge 3x 30 sec x Full jerri stretch grabbing LLE   Hamstring stretch  3x 30 sec      Longitudinal hip ER/IR 10x 2-3 sec  x    Bridge 2x5 5 sec  Cues to perform in pain free range   PPT with march  10x      PPT with hip adduction ball squeeze 10x 5 sec      PPT with bent knee fall out (bilateral and unilateral) 10x 2-3 sec      PPT with SLR 5x B  X Performed with small range today to avoid right hip agitation. Sidelying clamshells  10x B   x    Sidelying reverse clamshells  5x B   x    Sidelying IT band stretch - leg in front of patient 3x 30 sec x Cues to find muscle knots in piriformis with fist                           Specific Interventions Next Treatment: NuStep warm up, follow with SI and lumbar stabilization (posterior tilt, marching, bridges, clams, reverse clam, SLR), hip stretches (hamstring, piriformis, hip flexor), deep pressure inhibition to R piriformis or possibly benefit from dry needling for muscle spasms and radicular R foot pain    Activity/Treatment Tolerance:  [x]  Patient tolerated treatment well  []  Patient limited by fatigue  []  Patient limited by pain   []  Patient limited by medical complications  []  Other:     Assessment: Several progressions made today for joint mobilizations at the hip and MET techniques for RLE anterior iliac rotation.  Patient denies increased soreness at end of session, advised to monitor pain. Leg lengths equal at end of session. Body Structures/Functions/Activity Limitations: impaired activity tolerance, impaired balance, impaired ROM, impaired sensation, impaired strength, pain, abnormal gait and abnormal posture  Prognosis: good    GOALS:  Patient Goal: decrease back pain     Short Term Goals:  Time Frame: 4 weeks    Patient will report decreased lower back and RLE radicular tingling pain from baseline 5/10 to less than 2/10 during therapy exercises in order to  one position for up to 10 mins for work. Patient will improve BLE PROM to 0-80 deg hamstring, 0-120 deg GURWINDER, and 0-30 deg hip extension in order to bend and lift without straining lumbar spine. Patient will demonstrate good abdominal bracing and body mechanics during therapy session in order to preserve neutral spine during household tasks. Patient will be IND with HEP in order to meet long term goals. Long Term Goals:  Time Frame: 8 weeks    Patient will improve score of Modified Oswestry LBP questionnaire from baseline 36% impaired to less than 25% in order to lift heavy items for household tasks and stand longer durations. Patient will squat to lift 20 lb weight from floor to standing with good body mechanics in order to perform household tasks and carry groceries with ease. Patient will improve BLE strength to 5/5 hips and knees in order to squat, lunge, and navigate steps without difficulty. Patient Education:   [x]  HEP/Education Completed:  Updated HEP, given sidelying IT band stretch for bursitis pain   [facility] Access Code: [address] [place]  []  No new Education completed  [x]  Reviewed Prior HEP      [x]  Patient verbalized and/or demonstrated understanding of education provided. []  Patient unable to verbalize and/or demonstrate understanding of education provided. Will continue education.   []  Barriers to learning:     PLAN:  Treatment Recommendations: Strengthening, Range of Motion, Balance Training, Functional Mobility Training, Manual Therapy - Soft Tissue Mobilization, Manual Therapy - Joint Manipulation, Pain Management, Home Exercise Program, Integrative Dry Needling, Aquatics and Modalities    []  Plan of care initiated. Plan to see patient 2 times per week for 8 weeks to address the treatment planned outlined above.   [x]  Continue with current plan of care  []  Modify plan of care as follows:    []  Hold pending physician visit  []  Discharge    Time In 1550   Time Out 1630   Timed Code Minutes: 40   Total Treatment Time: 40     Electronically Signed by: Carrington Olivares PT

## 2022-03-11 ENCOUNTER — APPOINTMENT (OUTPATIENT)
Dept: PHYSICAL THERAPY | Age: 47
End: 2022-03-11
Payer: COMMERCIAL

## 2022-03-16 ENCOUNTER — APPOINTMENT (OUTPATIENT)
Dept: PHYSICAL THERAPY | Age: 47
End: 2022-03-16
Payer: COMMERCIAL

## 2022-03-18 ENCOUNTER — HOSPITAL ENCOUNTER (OUTPATIENT)
Dept: PHYSICAL THERAPY | Age: 47
Setting detail: THERAPIES SERIES
Discharge: HOME | End: 2022-03-18
Payer: COMMERCIAL

## 2022-03-18 PROCEDURE — 97110 THERAPEUTIC EXERCISES: CPT

## 2022-03-18 NOTE — PROGRESS NOTES
[facility]  [facility]  [] EVALUATION  [x] DAILY NOTE (LAND) [] DAILY NOTE (AQUATIC ) [] PROGRESS NOTE [] DISCHARGE NOTE    [x] OUTPATIENT REHABILITATION CENTER - [place]   [] [place] [address]    [] [facility]   [] Hamilton Perez    Date: 3/18/2022  Patient Name:  Binh Hagan  : 1975  MRN: [id number]  CSN: [id number]    Referring Practitioner BRIAN Gomes - *   Diagnosis Spondylosis without myelopathy or radiculopathy, lumbar region [M47.816]   G89.4 (ICD-10-CM) - Chronic pain syndrome   M25.559 (ICD-10-CM) - Hip pain   M25.561, M25.562, G89.29 (ICD-10-CM) - Chronic pain of both knees      Treatment Diagnosis Lumbago, spinal stiffness, B SI pain, B hip stiffness, muscular weakness, abnormal posture   Date of Evaluation 22    Additional Pertinent History Chronic neck and elbow pain, chronic back pain, depression, fibromyalgia      Functional Outcome Measure Used Modified Oswestry LBP questionnaire    Functional Outcome Score  or 36% impaired (22)       Insurance: Primary: Payor: Jose Núñez /  /  / ,   Secondary:    Authorization Information: 20% copay. No visit limit, no ionto, no heat/cold    Visit # 5, 5/10 for progress note   Visits Allowed: unlimited   Recertification Date:    Physician Follow-Up: 2022   Physician Orders:    History of Present Illness: Chronic back pain, sees chiropractor for SI adjustments which causes muscle spasms. Tried muscle relaxers but it reacted poorly with her migraine medicine, made her dizzy. She has tried heat/ice, TENS, stretching while laying. Poor tolerance to massage. SUBJECTIVE: Patient presents with migraine headache today very severe - she declines wanting to perform any stretches today and feels very dizzy and nauseated. Overall she feels therapy exercises have been a good challenge for her strengthening, but she is still having severe R hip and groin pain.  Some of the hip rotation exercises provoke this pain a lot and she wants to get back with pain management soon but insurance requires her to complete therapy first.     Objective:  TREATMENT   Precautions: L5/S1 congenital fusion   Pain: 4/10 Right hip, Lower back; Minimal R foot tingling    X in shaded column indicates activity completed today   Modalities Parameters/  Location  Notes                     Manual Therapy Time/Technique  Notes   R hip mobilizations with belt - distraction paired with passive GURWINDER stretch       Observation - RLE appearing longer than LLE supine and hooklying Left knee higher      Right SI MET - Right isometric hip extension       Exercise/Intervention   Notes   NuStep warm up Seat 7 arms 8 5 mins L3            Hooklying posterior pelvic tilt 10x 5 sec     Piriformis stretch - leg crossed pulling knee 3x 30 sec     Right hip flexor stretch supine off edge 3x 30 sec  Full jerri stretch grabbing LLE   Hamstring stretch  3x 30 sec      Longitudinal hip ER/IR 10x 2-3 sec      Bridge 2x5 5 sec  Cues to perform in pain free range   PPT with march  10x      PPT with hip adduction ball squeeze 10x 5 sec      PPT with bent knee fall out (bilateral and unilateral) 10x 2-3 sec      PPT with SLR 5x B   Performed with small range today to avoid right hip agitation. Sidelying clamshells  10x B       Sidelying reverse clamshells  5x B       Sidelying IT band stretch - leg in front of patient 3x 30 sec  Cues to find muscle knots in piriformis with fist           Reassessed - reviewed progress and continued R hip pain, reviewed HEP and issued new handouts 20 mins  x             Specific Interventions Next Treatment: Dry needling or ultrasound for R hip next time.  Follow with SI and lumbar stabilization (posterior tilt, marching, bridges, clams, reverse clam, SLR), hip stretches (hamstring, piriformis, hip flexor), deep pressure inhibition to R piriformis or possibly benefit from dry needling for muscle spasms and radicular R foot pain    Activity/Treatment Tolerance:  [x]  Patient tolerated treatment well  []  Patient limited by fatigue  []  Patient limited by pain   []  Patient limited by medical complications  []  Other:     Assessment: Overall not tolerating therapy well. Encouraged her to complete the XR imaging ordered by pain management - B knee and B hips have not been completed yet. Also encouraged her to call and see if she can have her appointment moved up or to discuss her severe R groin pain. Questionable hip impingement syndrome but needs to have her imaging done. While we wait for her to be seen by pain management, I encouraged patient to continue therapy so we can attempt some modalities for the R hip including ultrasound or dry needling - patient agreed. Body Structures/Functions/Activity Limitations: impaired activity tolerance, impaired balance, impaired ROM, impaired sensation, impaired strength, pain, abnormal gait and abnormal posture  Prognosis: good    GOALS:  Patient Goal: decrease back pain     Short Term Goals:  Time Frame: 4 weeks    Patient will report decreased lower back and RLE radicular tingling pain from baseline 5/10 to less than 2/10 during therapy exercises in order to  one position for up to 10 mins for work. Patient will improve BLE PROM to 0-80 deg hamstring, 0-120 deg GURWINDER, and 0-30 deg hip extension in order to bend and lift without straining lumbar spine. Patient will demonstrate good abdominal bracing and body mechanics during therapy session in order to preserve neutral spine during household tasks. Patient will be IND with HEP in order to meet long term goals. Long Term Goals:  Time Frame: 8 weeks    Patient will improve score of Modified Oswestry LBP questionnaire from baseline 36% impaired to less than 25% in order to lift heavy items for household tasks and stand longer durations.     Patient will squat to lift 20 lb weight from floor to standing with good body mechanics in order to perform household tasks and carry groceries with ease. Patient will improve BLE strength to 5/5 hips and knees in order to squat, lunge, and navigate steps without difficulty. Patient Education:   [x]  HEP/Education Completed:  Given sidelying IT band stretch for bursitis pain - reissued HEP handouts 3/18. Spent greater than 15 minutes educating patient on plan for PT and therapy exercises - encouraged to complete XRs ordered and discussed purpose of ultrasound or dry needling for pain.  [facility] Access Code: [address] [place]  []  No new Education completed  [x]  Reviewed Prior HEP      [x]  Patient verbalized and/or demonstrated understanding of education provided. []  Patient unable to verbalize and/or demonstrate understanding of education provided. Will continue education. []  Barriers to learning:     PLAN:  Treatment Recommendations: Strengthening, Range of Motion, Balance Training, Functional Mobility Training, Manual Therapy - Soft Tissue Mobilization, Manual Therapy - Joint Manipulation, Pain Management, Home Exercise Program, Integrative Dry Needling, Aquatics and Modalities    []  Plan of care initiated. Plan to see patient 2 times per week for 8 weeks to address the treatment planned outlined above.   [x]  Continue with current plan of care  []  Modify plan of care as follows:    []  Hold pending physician visit  []  Discharge    Time In 1505   Time Out 1525   Timed Code Minutes: 20   Total Treatment Time: 20     Electronically Signed by: Сергей Vigil PT

## 2022-03-29 ENCOUNTER — HOSPITAL ENCOUNTER (OUTPATIENT)
Dept: PHYSICAL THERAPY | Age: 47
Setting detail: THERAPIES SERIES
Discharge: HOME | End: 2022-03-29
Payer: COMMERCIAL

## 2022-03-29 PROCEDURE — 97110 THERAPEUTIC EXERCISES: CPT

## 2022-03-29 PROCEDURE — 97035 APP MDLTY 1+ULTRASOUND EA 15: CPT

## 2022-03-29 NOTE — PROGRESS NOTES
[facility]  PHYSICAL THERAPY  [] EVALUATION  [x] DAILY NOTE (LAND) [] DAILY NOTE (AQUATIC ) [] PROGRESS NOTE [] DISCHARGE NOTE    [x] OUTPATIENT REHABILITATION CENTER - [place]   [] [place] [address]    [] [facility]   [] Erendira Michel    Date: 3/29/2022  Patient Name:  Uyen López  : 1975  MRN: [id number]  CSN: [id number]    Referring Practitioner BRIAN Harvey - *   Diagnosis Spondylosis without myelopathy or radiculopathy, lumbar region [M47.816]   G89.4 (ICD-10-CM) - Chronic pain syndrome   M25.559 (ICD-10-CM) - Hip pain   M25.561, M25.562, G89.29 (ICD-10-CM) - Chronic pain of both knees      Treatment Diagnosis Lumbago, spinal stiffness, B SI pain, B hip stiffness, muscular weakness, abnormal posture   Date of Evaluation 22    Additional Pertinent History Chronic neck and elbow pain, chronic back pain, depression, fibromyalgia      Functional Outcome Measure Used Modified Oswestry LBP questionnaire    Functional Outcome Score 18/50 or 36% impaired (22)       Insurance: Primary: Payor: Denita Marlow /  /  / ,   Secondary:    Authorization Information: 20% copay. No visit limit, no ionto, no heat/cold    Visit # 6, 6/10 for progress note   Visits Allowed: unlimited   Recertification Date:    Physician Follow-Up: 2022  Hip XR ordered, not completed yet   Physician Orders:    History of Present Illness: Chronic back pain, sees chiropractor for SI adjustments which causes muscle spasms. Tried muscle relaxers but it reacted poorly with her migraine medicine, made her dizzy. She has tried heat/ice, TENS, stretching while laying. Poor tolerance to massage. SUBJECTIVE: Patient denies migraine, rates R hip pain 4/10. Denies foot tingling, but lower back pain present on R side /10. She went to [facility] this weekend for her [facility] and was able to walk around some short distances but she is fatigued.      Objective:  TREATMENT Precautions: L5/S1 congenital fusion   Pain: 4/10 Right hip, Lower back; Minimal R foot tingling    X in shaded column indicates activity completed today   Modalities Parameters/  Location  Notes   Ultrasound Right hip trochanter - 1.0 MHz, 1.0 w/cm2, 100% 8 mins x                Manual Therapy Time/Technique  Notes   R hip mobilizations with belt - distraction paired with passive GURWINDER stretch       Observation - RLE appearing longer than LLE supine and hooklying Left knee higher      Right SI MET - Right isometric hip extension       Exercise/Intervention   Notes   NuStep warm up Seat 7 arms 8 5 mins L3            Hooklying posterior pelvic tilt 10x 5 sec     Piriformis stretch - leg crossed pulling knee 3x 30 sec x    Right hip flexor stretch supine off edge 3x 30 sec x Full jerri stretch grabbing LLE   Hamstring stretch  3x 30 sec  x    Longitudinal hip ER/IR 10x 2-3 sec      Bridge 2x5 5 sec  Cues to perform in pain free range   PPT with march  10x      PPT with hip adduction ball squeeze 10x 5 sec      PPT with bent knee fall out (bilateral and unilateral) 10x 2-3 sec      PPT with SLR 5x B   Performed with small range today to avoid right hip agitation. Sidelying clamshells  10x B       Sidelying reverse clamshells  5x B       Sidelying IT band stretch - leg in front of patient 3x 30 sec x Cues to find muscle knots in piriformis with fist           Reassessed - reviewed progress and continued R hip pain, reviewed HEP and issued new handouts 20 mins  x             Specific Interventions Next Treatment: Dry needling or ultrasound for R hip next time.  Follow with SI and lumbar stabilization (posterior tilt, marching, bridges, clams, reverse clam, SLR), hip stretches (hamstring, piriformis, hip flexor), deep pressure inhibition to R piriformis or possibly benefit from dry needling for muscle spasms and radicular R foot pain    Activity/Treatment Tolerance:  [x]  Patient tolerated treatment well  []  Patient limited by fatigue  []  Patient limited by pain   []  Patient limited by medical complications  []  Other:     Assessment:  Initiated ultrasound today for improved tissue flexibility and decreased pain. Followed with hip stretches with fair tolerance. Encouraged to complete imaging ordered by pain management. Body Structures/Functions/Activity Limitations: impaired activity tolerance, impaired balance, impaired ROM, impaired sensation, impaired strength, pain, abnormal gait and abnormal posture. Prognosis: good    GOALS:  Patient Goal: decrease back pain     Short Term Goals:  Time Frame: 4 weeks    Patient will report decreased lower back and RLE radicular tingling pain from baseline 5/10 to less than 2/10 during therapy exercises in order to  one position for up to 10 mins for work. Patient will improve BLE PROM to 0-80 deg hamstring, 0-120 deg GURWINDER, and 0-30 deg hip extension in order to bend and lift without straining lumbar spine. Patient will demonstrate good abdominal bracing and body mechanics during therapy session in order to preserve neutral spine during household tasks. Patient will be IND with HEP in order to meet long term goals. Long Term Goals:  Time Frame: 8 weeks    Patient will improve score of Modified Oswestry LBP questionnaire from baseline 36% impaired to less than 25% in order to lift heavy items for household tasks and stand longer durations. Patient will squat to lift 20 lb weight from floor to standing with good body mechanics in order to perform household tasks and carry groceries with ease. Patient will improve BLE strength to 5/5 hips and knees in order to squat, lunge, and navigate steps without difficulty. Patient Education:   [x]  HEP/Education Completed:  Given sidelying IT band stretch for bursitis pain - reissued HEP handouts 3/18.  Spent greater than 15 minutes educating patient on plan for PT and therapy exercises - encouraged to complete XRs ordered and discussed purpose of ultrasound or dry needling for pain.  [employer] Access Code: [address] [facility]  []  No new Education completed  [x]  Reviewed Prior HEP      [x]  Patient verbalized and/or demonstrated understanding of education provided. []  Patient unable to verbalize and/or demonstrate understanding of education provided. Will continue education. []  Barriers to learning:     PLAN:  Treatment Recommendations: Strengthening, Range of Motion, Balance Training, Functional Mobility Training, Manual Therapy - Soft Tissue Mobilization, Manual Therapy - Joint Manipulation, Pain Management, Home Exercise Program, Integrative Dry Needling, Aquatics and Modalities    []  Plan of care initiated. Plan to see patient 2 times per week for 8 weeks to address the treatment planned outlined above.   [x]  Continue with current plan of care  []  Modify plan of care as follows:    []  Hold pending physician visit  []  Discharge    Time In 1615   Time Out 1645   Timed Code Minutes: 30   Total Treatment Time: 30     Electronically Signed by: Sami Fernando PT

## 2022-03-31 ENCOUNTER — HOSPITAL ENCOUNTER (OUTPATIENT)
Dept: PHYSICAL THERAPY | Age: 47
Setting detail: THERAPIES SERIES
Discharge: HOME | End: 2022-03-31
Payer: COMMERCIAL

## 2022-03-31 PROCEDURE — 97035 APP MDLTY 1+ULTRASOUND EA 15: CPT

## 2022-03-31 PROCEDURE — 97110 THERAPEUTIC EXERCISES: CPT

## 2022-03-31 NOTE — PROGRESS NOTES
[facility]  PHYSICAL THERAPY  [] EVALUATION  [x] DAILY NOTE (LAND) [] DAILY NOTE (AQUATIC ) [] PROGRESS NOTE [] DISCHARGE NOTE    [x] OUTPATIENT REHABILITATION CENTER [facility]   [] [place] [address]    [] [facility]   [] Rip Delgado    Date: 3/31/2022  Patient Name:  Marybeth Gibbons  : 1975  MRN: [id number]  CSN: [id number]    Referring Practitioner BRIAN Curry - *   Diagnosis Spondylosis without myelopathy or radiculopathy, lumbar region [M47.816]   G89.4 (ICD-10-CM) - Chronic pain syndrome   M25.559 (ICD-10-CM) - Hip pain   M25.561, M25.562, G89.29 (ICD-10-CM) - Chronic pain of both knees      Treatment Diagnosis Lumbago, spinal stiffness, B SI pain, B hip stiffness, muscular weakness, abnormal posture   Date of Evaluation 22    Additional Pertinent History Chronic neck and elbow pain, chronic back pain, depression, fibromyalgia      Functional Outcome Measure Used Modified Oswestry LBP questionnaire    Functional Outcome Score 18/50 or 36% impaired (22)       Insurance: Primary: Payor: Willard Falcon 150 /  /  / ,   Secondary:    Authorization Information: 20% copay. No visit limit, no ionto, no heat/cold    Visit # 7, 7/10 for progress note   Visits Allowed: unlimited   Recertification Date:    Physician Follow-Up: 2022  Hip XR ordered, not completed yet   Physician Orders:    History of Present Illness: Chronic back pain, sees chiropractor for SI adjustments which causes muscle spasms. Tried muscle relaxers but it reacted poorly with her migraine medicine, made her dizzy. She has tried heat/ice, TENS, stretching while laying. Poor tolerance to massage. SUBJECTIVE: Patient felt good after ultrasound last time, and was able to balance on the RLE for a reaching task without sharp pains in the hip. Today her hip is a little stiff from working.      Objective:  TREATMENT   Precautions: L5/S1 congenital fusion   Pain: 4/10 Right hip, Lower back; Minimal R foot tingling    X in shaded column indicates activity completed today   Modalities Parameters/  Location  Notes   Ultrasound Right hip trochanter - 1.0 MHz, 1.0 w/cm2, 100% 8 mins x                Manual Therapy Time/Technique  Notes   R hip mobilizations with belt - distraction paired with passive GURWINDER stretch       Observation - RLE appearing longer than LLE supine and hooklying Left knee higher      Right SI MET - Right isometric hip extension       Exercise/Intervention   Notes   NuStep warm up Seat 7 arms 8 5 mins L3            Hooklying posterior pelvic tilt 10x 5 sec     Piriformis stretch - leg crossed pulling knee 3x 30 sec x    Right hip flexor stretch supine off edge 3x 30 sec x Full jerri stretch grabbing LLE   Hamstring stretch  3x 30 sec  x    Longitudinal hip ER/IR 10x 2-3 sec      Bridge 2x5 5 sec  Cues to perform in pain free range   PPT with march  10x      PPT with hip adduction ball squeeze 10x 5 sec      PPT with bent knee fall out (bilateral and unilateral) 10x 2-3 sec      PPT with SLR 5x B   Performed with small range today to avoid right hip agitation. Sidelying clamshells  10x B       Sidelying reverse clamshells  5x B       Sidelying IT band stretch - leg in front of patient 3x 30 sec x Cues to find muscle knots in piriformis with fist                    Specific Interventions Next Treatment: Dry needling or ultrasound for R hip next time.  Follow with SI and lumbar stabilization (posterior tilt, marching, bridges, clams, reverse clam, SLR), hip stretches (hamstring, piriformis, hip flexor), deep pressure inhibition to R piriformis or possibly benefit from dry needling for muscle spasms and radicular R foot pain    Activity/Treatment Tolerance:  [x]  Patient tolerated treatment well  []  Patient limited by fatigue  []  Patient limited by pain   []  Patient limited by medical complications  []  Other:     Assessment:  Continued ultrasound today for improved tissue flexibility and decreased pain. Followed with hip stretches with fair tolerance. Encouraged to complete imaging ordered by pain management. Body Structures/Functions/Activity Limitations: impaired activity tolerance, impaired balance, impaired ROM, impaired sensation, impaired strength, pain, abnormal gait and abnormal posture. Prognosis: good    GOALS:  Patient Goal: decrease back pain     Short Term Goals:  Time Frame: 4 weeks    Patient will report decreased lower back and RLE radicular tingling pain from baseline 5/10 to less than 2/10 during therapy exercises in order to  one position for up to 10 mins for work. Patient will improve BLE PROM to 0-80 deg hamstring, 0-120 deg GURWINDER, and 0-30 deg hip extension in order to bend and lift without straining lumbar spine. Patient will demonstrate good abdominal bracing and body mechanics during therapy session in order to preserve neutral spine during household tasks. Patient will be IND with HEP in order to meet long term goals. Long Term Goals:  Time Frame: 8 weeks    Patient will improve score of Modified Oswestry LBP questionnaire from baseline 36% impaired to less than 25% in order to lift heavy items for household tasks and stand longer durations. Patient will squat to lift 20 lb weight from floor to standing with good body mechanics in order to perform household tasks and carry groceries with ease. Patient will improve BLE strength to 5/5 hips and knees in order to squat, lunge, and navigate steps without difficulty. Patient Education:   []  HEP/Education Completed:  HEP handouts given, monitor pain after ultrasound   [employer] Access Code: [facility]  []  No new Education completed  [x]  Reviewed Prior HEP      [x]  Patient verbalized and/or demonstrated understanding of education provided. []  Patient unable to verbalize and/or demonstrate understanding of education provided.   Will continue education. []  Barriers to learning:     PLAN:  Treatment Recommendations: Strengthening, Range of Motion, Balance Training, Functional Mobility Training, Manual Therapy - Soft Tissue Mobilization, Manual Therapy - Joint Manipulation, Pain Management, Home Exercise Program, Integrative Dry Needling, Aquatics and Modalities    []  Plan of care initiated. Plan to see patient 2 times per week for 8 weeks to address the treatment planned outlined above.   [x]  Continue with current plan of care  []  Modify plan of care as follows:    []  Hold pending physician visit  []  Discharge    Time In 1445   Time Out 1515   Timed Code Minutes: 30   Total Treatment Time: 30     Electronically Signed by: David Sun PT

## 2022-04-08 ENCOUNTER — APPOINTMENT (OUTPATIENT)
Dept: PHYSICAL THERAPY | Age: 47
End: 2022-04-08
Payer: COMMERCIAL

## 2022-04-12 ENCOUNTER — HOSPITAL ENCOUNTER (OUTPATIENT)
Dept: PHYSICAL THERAPY | Age: 47
Setting detail: THERAPIES SERIES
Discharge: HOME | End: 2022-04-12
Payer: COMMERCIAL

## 2022-04-12 PROCEDURE — 97110 THERAPEUTIC EXERCISES: CPT

## 2022-04-12 NOTE — DISCHARGE SUMMARY
[address] [facility]  PHYSICAL THERAPY  [] EVALUATION  [] DAILY NOTE (LAND) [] DAILY NOTE (AQUATIC ) [] PROGRESS NOTE [x] DISCHARGE NOTE    [x] OUTPATIENT REHABILITATION CENTER [facility]   [] [place] [address]    [] [facility]   [] [facility]    Date: 2022  Patient Name:  Gunner Umana  : 1975  MRN: [id number]  CSN: [id number]    Referring Practitioner BRIAN Benjamin - *   Diagnosis Spondylosis without myelopathy or radiculopathy, lumbar region [M47.816]   G89.4 (ICD-10-CM) - Chronic pain syndrome   M25.559 (ICD-10-CM) - Hip pain   M25.561, M25.562, G89.29 (ICD-10-CM) - Chronic pain of both knees      Treatment Diagnosis Lumbago, spinal stiffness, B SI pain, B hip stiffness, muscular weakness, abnormal posture   Date of Evaluation 22    Additional Pertinent History Chronic neck and elbow pain, chronic back pain, depression, fibromyalgia      Functional Outcome Measure Used Modified Oswestry LBP questionnaire    Functional Outcome Score 18/50 or 36% impaired (22)   14/50 or 28% impaired (22)      Insurance: Primary: Payor: Willard Falcon 150 /  /  / ,   Secondary:    Authorization Information: 20% copay. No visit limit, no ionto, no heat/cold    Visit # 8, 8/10 for progress note   Visits Allowed: unlimited   Recertification Date: 635   Physician Follow-Up: 2022  Hip XR ordered, not completed yet   Physician Orders:    History of Present Illness: Chronic back pain, sees chiropractor for SI adjustments which causes muscle spasms. Tried muscle relaxers but it reacted poorly with her migraine medicine, made her dizzy. She has tried heat/ice, TENS, stretching while laying. Poor tolerance to massage. SUBJECTIVE: Patient feels therapy has made no change on her lower back pain, and she feels it has aggravated her R hip. She has been performing stretches consistently.  She still struggles a lot with standing in static positions at work, and walking over uneven terrain while spending time outdoors. Objective:  TREATMENT   Precautions: L5/S1 congenital fusion   Pain: 4/10 Right hip, Lower back; Minimal R foot tingling    X in shaded column indicates activity completed today   Modalities Parameters/  Location  Notes   Ultrasound Right hip trochanter - 1.0 MHz, 1.0 w/cm2, 100% 8 mins                 Manual Therapy Time/Technique  Notes   R hip mobilizations with belt - distraction paired with passive GURWINDER stretch       Observation - RLE appearing longer than LLE supine and hooklying Left knee higher      Right SI MET - Right isometric hip extension       Exercise/Intervention   Notes   NuStep warm up Seat 7 arms 8 5 mins L3            Hooklying posterior pelvic tilt 10x 5 sec     Piriformis stretch - leg crossed pulling knee  3x 30 sec x    Right hip flexor stretch supine off edge  3x 30 sec x Full jerri stretch grabbing LLE   Hamstring stretch  3x 30 sec  x    Longitudinal hip ER/IR 10x 2-3 sec      Bridge 2x5 5 sec  Cues to perform in pain free range   PPT with march  10x      PPT with hip adduction ball squeeze 10x 5 sec      PPT with bent knee fall out (bilateral and unilateral) 10x 2-3 sec      PPT with SLR 5x B   Performed with small range today to avoid right hip agitation. Sidelying clamshells  10x B       Sidelying reverse clamshells  5x B       Sidelying IT band stretch - leg in front of patient 3x 30 sec x Cues to find muscle knots in piriformis with fist           Reassessment: see goals  20 mins   x      Specific Interventions Next Treatment: Dry needling or ultrasound for R hip next time.  Follow with SI and lumbar stabilization (posterior tilt, marching, bridges, clams, reverse clam, SLR), hip stretches (hamstring, piriformis, hip flexor), deep pressure inhibition to R piriformis or possibly benefit from dry needling for muscle spasms and radicular R foot pain    Activity/Treatment Tolerance:  [x]  Patient tolerated treatment well  []  Patient limited by fatigue  []  Patient limited by pain   []  Patient limited by medical complications  []  Other:     Assessment: Patient has been attending PT for two months, and has tried traditional lumbar stabilization and hip stretching, as well as ultrasound to the R IT band/trochanter region. She still feels about the same pain as before she started therapy - lots of difficulty standing in static positions at work while lab tests run. Encouraged to complete imaging ordered by pain management. Plan to discharge from PT and encouraged her to continue HEP independently. Body Structures/Functions/Activity Limitations: impaired activity tolerance, impaired balance, impaired ROM, impaired sensation, impaired strength, pain, abnormal gait and abnormal posture. Prognosis: good    GOALS:  Patient Goal: decrease back pain     Short Term Goals:  Time Frame: 4 weeks    Patient will report decreased lower back and RLE radicular tingling pain from baseline 5/10 to less than 2/10 during therapy exercises in order to  one position for up to 10 mins for work. GOAL NOT MET: pain still elevated especially with standing, rated 3-4/10 . Discontinue Goal    Patient will improve BLE PROM to 0-80 deg hamstring, 0-120 deg GURWINDER, and 0-30 deg hip extension in order to bend and lift without straining lumbar spine. GOAL MET:  full GURWINDER 0-130, hamstring 0-90 deg, hip extension 0-30 deg bilaterally. Discontinue Goal    Patient will demonstrate good abdominal bracing and body mechanics during therapy session in order to preserve neutral spine during household tasks. GOAL MET:  good posterior tilt technique . Discontinue Goal    Patient will be IND with HEP in order to meet long term goals. GOAL MET:  performing HEP handouts consistently.   Discontinue Goal    Long Term Goals:  Time Frame: 8 weeks    Patient will improve score of Modified Oswestry LBP questionnaire from baseline 36% impaired to less than 25% in order to lift heavy items for household tasks and stand longer durations. GOAL NOT MET: no significant change, 28% impaired. Discontinue Goal    Patient will squat to lift 20 lb weight from floor to standing with good body mechanics in order to perform household tasks and carry groceries with ease. GOAL MET:  good squat mobility but at times struggles with knee OA. Discontinue Goal    Patient will improve BLE strength to 5/5 hips and knees in order to squat, lunge, and navigate steps without difficulty. GOAL MET:  5/5 SLR, hip abd, hip add, and bridge all no pain. Discontinue Goal        Patient Education:   []  HEP/Education Completed:  HEP handouts given, monitor pain after ultrasound   [employer] Access Code: [address]  []  No new Education completed  [x]  Reviewed Prior HEP      [x]  Patient verbalized and/or demonstrated understanding of education provided. []  Patient unable to verbalize and/or demonstrate understanding of education provided. Will continue education.   []  Barriers to learning:     PLAN:    [x]  Discharge    Time In 1505   Time Out 1530   Timed Code Minutes: 25   Total Treatment Time: 25     Electronically Signed by: Jeronimo Mcintosh PT

## 2022-04-26 ENCOUNTER — HOSPITAL ENCOUNTER (OUTPATIENT)
Dept: GENERAL RADIOLOGY | Age: 47
Discharge: HOME | End: 2022-04-26
Payer: COMMERCIAL

## 2022-04-26 ENCOUNTER — HOSPITAL ENCOUNTER (OUTPATIENT)
Age: 47
Discharge: HOME | End: 2022-04-26
Payer: COMMERCIAL

## 2022-04-26 DIAGNOSIS — M25.559 HIP PAIN: ICD-10-CM

## 2022-04-26 DIAGNOSIS — G89.29 CHRONIC PAIN OF BOTH KNEES: ICD-10-CM

## 2022-04-26 DIAGNOSIS — M25.562 CHRONIC PAIN OF BOTH KNEES: ICD-10-CM

## 2022-04-26 DIAGNOSIS — M25.561 CHRONIC PAIN OF BOTH KNEES: ICD-10-CM

## 2022-04-26 PROCEDURE — 73562 X-RAY EXAM OF KNEE 3: CPT

## 2022-04-26 PROCEDURE — 73523 X-RAY EXAM HIPS BI 5/> VIEWS: CPT

## 2022-04-28 ENCOUNTER — NURSE ONLY (OUTPATIENT)
Dept: LAB | Age: 47
End: 2022-04-28

## 2022-04-28 DIAGNOSIS — M25.561 CHRONIC PAIN OF BOTH KNEES: ICD-10-CM

## 2022-04-28 DIAGNOSIS — M25.559 HIP PAIN: ICD-10-CM

## 2022-04-28 DIAGNOSIS — G89.4 CHRONIC PAIN SYNDROME: ICD-10-CM

## 2022-04-28 DIAGNOSIS — M47.816 SPONDYLOSIS OF LUMBAR REGION WITHOUT MYELOPATHY OR RADICULOPATHY: ICD-10-CM

## 2022-04-28 DIAGNOSIS — G89.29 CHRONIC PAIN OF BOTH KNEES: ICD-10-CM

## 2022-04-28 DIAGNOSIS — M25.562 CHRONIC PAIN OF BOTH KNEES: ICD-10-CM

## 2022-04-28 LAB
C-REACTIVE PROTEIN: 2.95 MG/DL (ref 0–1)
RHEUMATOID FACTOR: < 10 IU/ML (ref 0–13)
SEDIMENTATION RATE, ERYTHROCYTE: 21 MM/HR (ref 0–20)

## 2022-05-01 LAB — ANA SCREEN: DETECTED

## 2022-05-03 LAB — ANTINUCLEAR ANTIBODY, HEP-2, IGG: NORMAL

## 2022-05-04 ENCOUNTER — OFFICE VISIT (OUTPATIENT)
Dept: PHYSICAL MEDICINE AND REHAB | Age: 47
End: 2022-05-04
Payer: COMMERCIAL

## 2022-05-04 VITALS
BODY MASS INDEX: 38.09 KG/M2 | WEIGHT: 215 LBS | HEIGHT: 63 IN | HEART RATE: 64 BPM | SYSTOLIC BLOOD PRESSURE: 130 MMHG | DIASTOLIC BLOOD PRESSURE: 76 MMHG

## 2022-05-04 DIAGNOSIS — R76.8 ANA POSITIVE: ICD-10-CM

## 2022-05-04 DIAGNOSIS — G89.4 CHRONIC PAIN SYNDROME: ICD-10-CM

## 2022-05-04 DIAGNOSIS — M25.562 CHRONIC PAIN OF BOTH KNEES: ICD-10-CM

## 2022-05-04 DIAGNOSIS — M47.816 SPONDYLOSIS OF LUMBAR REGION WITHOUT MYELOPATHY OR RADICULOPATHY: Primary | ICD-10-CM

## 2022-05-04 DIAGNOSIS — M53.3 SACROILIAC JOINT DYSFUNCTION: ICD-10-CM

## 2022-05-04 DIAGNOSIS — G89.29 CHRONIC PAIN OF BOTH KNEES: ICD-10-CM

## 2022-05-04 DIAGNOSIS — R79.82 CRP ELEVATED: ICD-10-CM

## 2022-05-04 DIAGNOSIS — M25.561 CHRONIC PAIN OF BOTH KNEES: ICD-10-CM

## 2022-05-04 DIAGNOSIS — M25.559 HIP PAIN: ICD-10-CM

## 2022-05-04 PROCEDURE — 99214 OFFICE O/P EST MOD 30 MIN: CPT | Performed by: NURSE PRACTITIONER

## 2022-05-04 ASSESSMENT — ENCOUNTER SYMPTOMS
WHEEZING: 0
VOMITING: 0
COUGH: 0
DIARRHEA: 0
SINUS PRESSURE: 0
BACK PAIN: 1
SORE THROAT: 0
COLOR CHANGE: 0
ABDOMINAL PAIN: 0
EYE PAIN: 0
SHORTNESS OF BREATH: 0
CHEST TIGHTNESS: 0
NAUSEA: 0
RHINORRHEA: 0
PHOTOPHOBIA: 0
CONSTIPATION: 0

## 2022-05-04 NOTE — PROGRESS NOTES
[address] [facility] [address]  Dept: [phone]  Dept Fax: [phone]: [phone]    Visit Date: 5/4/2022    Functionality Assessment/Goals Worksheet     On a scale of 0 (Does not Interfere) to 10 (Completely Interferes)     1. Which number describes how during the past week pain has interfered with       the following:  A. General Activity:  3  B. Mood: 4  C. Walking Ability:  3  D. Normal Work (Includes both work outside the home and housework):  3  E. Relations with Other People:   3  F. Sleep:   2  G. Enjoyment of Life:   4    2. Patient Prefers to Take their Pain Medications:     []  On a regular basis   [x]  Only when necessary    []  Does not take pain medications    3. What are the Patient's Goals/Expectations for Visiting Pain Management? []  Learn about my pain    [x]  Receive Medication   []  Physical Therapy     []  Treat Depression   [x]  Receive Injections    []  Treat Sleep   []  Deal with Anxiety and Stress   []  Treat Opoid Dependence/Addiction   []  Other:      HPI:   John Gutiérrez is a 50 y.o. female is here today for    Chief Complaint: Low back pain and SI pain    HPI   F/U XRs Rheumatology screen labs and  PT    She completed PT  2/18/-4/12/2022 helped back  but increaed hip pain. US helped the most. She continues to have low back Hip knee pain . Has had lumbar pain for many years. She works for [employer] as . On her feet a lot with bending heavy lifting standing at bench. She has seen Dr Momo Gao in past, She did not care for his care and never went back. She denies any trauma  fall or MVC. She has had Endometriosis with hysterectomy with weight gain but had back pain prior to that. She has constant lumbar pain  radiates into bilateral Hips SI and down RLE stops above the knee.     She was taking Baclofen interacted with Topamax for migraines, Mobic. Zomig didn't work for her migraines. She complains of hip and knee pain also. Patient pain increases with bending, lifting, twisting , turning torso, standing and housework or working at job. Treatments tried ice, heat, Chiropractor, NSAIDS, muscle relaxer, OTC rubs creams patches and TENS  Pain description sharp and aching      She denies any ER visits or new health issues since last visit. Pain scale with out pain medications or at its worst is 4/10. Pain scale with pain medications or at its best is 3/10. Radiology:    Partial sacralization of L5 with a pseudoarthrosis on the left. Moderate degenerative facet arthropathy L4-5 bilaterally. Mild disc space narrowing L4-5.   2. Otherwise normal lumbar spine. No fracture. Sacroiliac joints unremarkable.             FINDINGS:        No fracture is identified. The bony mineralization is appropriate. The alignment is preserved. Mild bilateral tricompartmental joint space narrowing and marginal spurring is most pronounced in the patellofemoral compartment.       The visualized soft tissues are unremarkable.           Impression   No fracture or acute bony abnormality noted in either knee. Chronic arthritic changes are discussed.            FINDINGS:        No fracture is identified. The bony mineralization is appropriate. The alignment is preserved. Mild bilateral tricompartmental joint space narrowing and marginal spurring is most pronounced in the patellofemoral compartment.       The visualized soft tissues are unremarkable.           Impression   No fracture or acute bony abnormality noted in either knee. Chronic arthritic changes are discussed.               : There is normal osseous alignment without visualized fracture. Joint spaces appear preserved.  No significant degenerative changes of the hips are identified.           Impression    Normal hip series.                 The patientis allergic to bactrim [sulfamethoxazole-trimethoprim] and vicodin [hydrocodone-acetaminophen]. Subjective:      Review of Systems   Constitutional: Positive for activity change. Negative for appetite change, chills, diaphoresis, fatigue, fever and unexpected weight change. HENT: Negative for congestion, ear pain, hearing loss, mouth sores, nosebleeds, rhinorrhea, sinus pressure and sore throat. Eyes: Negative for photophobia, pain and visual disturbance. Respiratory: Negative for cough, chest tightness, shortness of breath and wheezing. ASTHMA   Cardiovascular: Negative for chest pain and palpitations. HTN HLD   Gastrointestinal: Negative for abdominal pain, constipation, diarrhea, nausea and vomiting. Endocrine: Negative for cold intolerance, heat intolerance, polydipsia, polyphagia and polyuria. Genitourinary: Negative for decreased urine volume, difficulty urinating, frequency and hematuria. Musculoskeletal: Positive for arthralgias, back pain, gait problem and myalgias. Negative for joint swelling, neck pain and neck stiffness. Skin: Negative for color change and rash. Allergic/Immunologic: Negative for food allergies and immunocompromised state. Neurological: Positive for headaches. Negative for dizziness, tremors, seizures, syncope, facial asymmetry, speech difficulty, weakness, light-headedness and numbness. Has Migraines Dr [facility]   Hematological: Does not bruise/bleed easily. Psychiatric/Behavioral: Negative for agitation, behavioral problems, confusion, decreased concentration, dysphoric mood, hallucinations, self-injury, sleep disturbance and suicidal ideas. The patient is nervous/anxious and is hyperactive. ADHD       Objective:     Vitals:    05/04/22 1550   BP: 130/76   Site: Left Upper Arm   Position: Sitting   Cuff Size: Large Adult   Pulse: 64   Weight: 215 lb (97.5 kg)   Height: 5' 3\" (1.6 m)       Physical Exam  Vitals and nursing note reviewed. Constitutional:       General: She is not in acute distress. Appearance: She is well-developed. She is not diaphoretic. HENT:      Head: Normocephalic and atraumatic. Right Ear: External ear normal.      Left Ear: External ear normal.      Nose: Nose normal.      Mouth/Throat:      Pharynx: No oropharyngeal exudate. Eyes:      General: No scleral icterus. Right eye: No discharge. Left eye: No discharge. Conjunctiva/sclera: Conjunctivae normal.      Pupils: Pupils are equal, round, and reactive to light. Neck:      Thyroid: No thyromegaly. Cardiovascular:      Rate and Rhythm: Normal rate and regular rhythm. Heart sounds: Normal heart sounds. No murmur heard. No friction rub. No gallop. Pulmonary:      Effort: Pulmonary effort is normal. No respiratory distress. Breath sounds: Normal breath sounds. No wheezing or rales. Chest:      Chest wall: No tenderness. Abdominal:      General: Bowel sounds are normal. There is no distension. Palpations: Abdomen is soft. Tenderness: There is no abdominal tenderness. There is no guarding or rebound. Musculoskeletal:      Right elbow: Decreased range of motion. Tenderness present. Cervical back: Full passive range of motion without pain, normal range of motion and neck supple. No edema, erythema or rigidity. No muscular tenderness. Normal range of motion. Thoracic back: Spasms, tenderness and bony tenderness present. Decreased range of motion. Lumbar back: Spasms, tenderness and bony tenderness present. Decreased range of motion. Back:       Right hip: Tenderness and bony tenderness present. Left hip: Tenderness and bony tenderness present. Right knee: Bony tenderness and crepitus present. Decreased range of motion. Tenderness present. Left knee: Bony tenderness and crepitus present. Decreased range of motion. Tenderness present. Skin:     General: Skin is warm. Coloration: Skin is not pale. Findings: No erythema or rash. Neurological:      Mental Status: She is alert and oriented to person, place, and time. She is not disoriented. Cranial Nerves: No cranial nerve deficit. Sensory: No sensory deficit. Motor: No atrophy or abnormal muscle tone. Coordination: Coordination normal.      Gait: Gait normal.      Deep Tendon Reflexes: Reflexes are normal and symmetric. Babinski sign absent on the right side. Psychiatric:         Attention and Perception: Attention and perception normal. She is attentive. Mood and Affect: Mood and affect normal. Mood is not anxious or depressed. Affect is not labile, blunt, angry or inappropriate. Speech: Speech normal. She is communicative. Speech is not rapid and pressured, delayed, slurred or tangential.         Behavior: Behavior normal. Behavior is not agitated, slowed, aggressive, withdrawn, hyperactive or combative. Behavior is cooperative. Thought Content: Thought content normal. Thought content is not paranoid or delusional. Thought content does not include homicidal or suicidal ideation. Thought content does not include homicidal or suicidal plan. Cognition and Memory: Cognition and memory normal. Memory is not impaired. She does not exhibit impaired recent memory or impaired remote memory. Judgment: Judgment normal. Judgment is not impulsive or inappropriate. GURWINDER  Patricks test  positive  Yeoman's  or Gaenslen's positive  Kemps  positive  Spurlings  na  Orozco's na         Assessment:     1. Spondylosis of lumbar region without myelopathy or radiculopathy    2. Chronic pain syndrome    3. Hip pain    4. Sacroiliac joint dysfunction    5. Chronic pain of both knees    6. LETY positive    7. CRP elevated            Plan:      · OARRS reviewed. Current MED: 0  · Patient was not offered naloxone for home.     Testing Labs or Radiology reviewed: hip knee Rheumatology labs  reviewed   Procedures:discussed Lumbar facet  L4-5,5-S1 bilateral #1   Discussed with patient about risks with procedure including infection, reaction to medication, increased pain, or bleeding.  Medications:Naproxen Zanaflex    Discussed Rheum referral if appropriate for  detected LETY and elevated CRP        Meds. Prescribed:   No orders of the defined types were placed in this encounter. Return for Lumbar Facet MBB @L4-5, 5-S1 bilateral#1, Follow up after procedure.                Electronically signed by BRIAN Koenig CNP on5/5/2022 at 9:01 AM

## 2022-05-05 ENCOUNTER — TELEPHONE (OUTPATIENT)
Dept: PHYSICAL MEDICINE AND REHAB | Age: 47
End: 2022-05-05

## 2022-05-09 DIAGNOSIS — M19.90 INFLAMMATORY ARTHRITIS: Primary | ICD-10-CM

## 2022-05-10 ENCOUNTER — NURSE ONLY (OUTPATIENT)
Dept: LAB | Age: 47
End: 2022-05-10

## 2022-05-10 DIAGNOSIS — M19.90 INFLAMMATORY ARTHRITIS: ICD-10-CM

## 2022-05-10 LAB
ALBUMIN SERPL-MCNC: 4.5 G/DL (ref 3.5–5.1)
ALP BLD-CCNC: 60 U/L (ref 38–126)
ALT SERPL-CCNC: 16 U/L (ref 11–66)
ANION GAP SERPL CALCULATED.3IONS-SCNC: 13 MEQ/L (ref 8–16)
AST SERPL-CCNC: 19 U/L (ref 5–40)
BASOPHILS # BLD: 0.8 %
BASOPHILS ABSOLUTE: 0.1 THOU/MM3 (ref 0–0.1)
BILIRUB SERPL-MCNC: < 0.2 MG/DL (ref 0.3–1.2)
BUN BLDV-MCNC: 22 MG/DL (ref 7–22)
CALCIUM SERPL-MCNC: 9.6 MG/DL (ref 8.5–10.5)
CHLORIDE BLD-SCNC: 104 MEQ/L (ref 98–111)
CO2: 24 MEQ/L (ref 23–33)
CREAT SERPL-MCNC: 1 MG/DL (ref 0.4–1.2)
EOSINOPHIL # BLD: 2.5 %
EOSINOPHILS ABSOLUTE: 0.2 THOU/MM3 (ref 0–0.4)
ERYTHROCYTE [DISTWIDTH] IN BLOOD BY AUTOMATED COUNT: 13.4 % (ref 11.5–14.5)
ERYTHROCYTE [DISTWIDTH] IN BLOOD BY AUTOMATED COUNT: 43.6 FL (ref 35–45)
GFR SERPL CREATININE-BSD FRML MDRD: 59 ML/MIN/1.73M2
GLUCOSE BLD-MCNC: 100 MG/DL (ref 70–108)
HCT VFR BLD CALC: 36.7 % (ref 37–47)
HEMOGLOBIN: 11.6 GM/DL (ref 12–16)
IMMATURE GRANS (ABS): 0.06 THOU/MM3 (ref 0–0.07)
IMMATURE GRANULOCYTES: 0.7 %
LYMPHOCYTES # BLD: 32.6 %
LYMPHOCYTES ABSOLUTE: 2.8 THOU/MM3 (ref 1–4.8)
MCH RBC QN AUTO: 28.3 PG (ref 26–33)
MCHC RBC AUTO-ENTMCNC: 31.6 GM/DL (ref 32.2–35.5)
MCV RBC AUTO: 89.5 FL (ref 81–99)
MONOCYTES # BLD: 6.9 %
MONOCYTES ABSOLUTE: 0.6 THOU/MM3 (ref 0.4–1.3)
NUCLEATED RED BLOOD CELLS: 0 /100 WBC
PLATELET # BLD: 383 THOU/MM3 (ref 130–400)
PMV BLD AUTO: 11.6 FL (ref 9.4–12.4)
POTASSIUM SERPL-SCNC: 4 MEQ/L (ref 3.5–5.2)
RBC # BLD: 4.1 MILL/MM3 (ref 4.2–5.4)
SEG NEUTROPHILS: 56.5 %
SEGMENTED NEUTROPHILS ABSOLUTE COUNT: 4.8 THOU/MM3 (ref 1.8–7.7)
SODIUM BLD-SCNC: 141 MEQ/L (ref 135–145)
TOTAL PROTEIN: 6.9 G/DL (ref 6.1–8)
WBC # BLD: 8.5 THOU/MM3 (ref 4.8–10.8)

## 2022-05-20 ENCOUNTER — PREP FOR PROCEDURE (OUTPATIENT)
Dept: PHYSICAL MEDICINE AND REHAB | Age: 47
End: 2022-05-20

## 2022-05-26 ENCOUNTER — ANESTHESIA (OUTPATIENT)
Dept: OPERATING ROOM | Age: 47
End: 2022-05-26
Payer: COMMERCIAL

## 2022-05-26 ENCOUNTER — HOSPITAL ENCOUNTER (OUTPATIENT)
Age: 47
Setting detail: OUTPATIENT SURGERY
Discharge: HOME | End: 2022-05-26
Attending: PAIN MEDICINE | Admitting: PAIN MEDICINE
Payer: COMMERCIAL

## 2022-05-26 ENCOUNTER — APPOINTMENT (OUTPATIENT)
Dept: GENERAL RADIOLOGY | Age: 47
End: 2022-05-26
Attending: PAIN MEDICINE
Payer: COMMERCIAL

## 2022-05-26 ENCOUNTER — ANESTHESIA EVENT (OUTPATIENT)
Dept: OPERATING ROOM | Age: 47
End: 2022-05-26
Payer: COMMERCIAL

## 2022-05-26 VITALS
DIASTOLIC BLOOD PRESSURE: 59 MMHG | TEMPERATURE: 97 F | BODY MASS INDEX: 37.03 KG/M2 | RESPIRATION RATE: 16 BRPM | HEIGHT: 63 IN | SYSTOLIC BLOOD PRESSURE: 98 MMHG | WEIGHT: 209 LBS | OXYGEN SATURATION: 93 % | HEART RATE: 69 BPM

## 2022-05-26 PROCEDURE — 2709999900 HC NON-CHARGEABLE SUPPLY: Performed by: PAIN MEDICINE

## 2022-05-26 PROCEDURE — 3700000001 HC ADD 15 MINUTES (ANESTHESIA): Performed by: PAIN MEDICINE

## 2022-05-26 PROCEDURE — 64494 INJ PARAVERT F JNT L/S 2 LEV: CPT | Performed by: PAIN MEDICINE

## 2022-05-26 PROCEDURE — 6360000002 HC RX W HCPCS: Performed by: PAIN MEDICINE

## 2022-05-26 PROCEDURE — 7100000011 HC PHASE II RECOVERY - ADDTL 15 MIN: Performed by: PAIN MEDICINE

## 2022-05-26 PROCEDURE — 6360000002 HC RX W HCPCS: Performed by: NURSE ANESTHETIST, CERTIFIED REGISTERED

## 2022-05-26 PROCEDURE — 3600000058 HC PAIN LEVEL 5 BASE: Performed by: PAIN MEDICINE

## 2022-05-26 PROCEDURE — 3209999900 FLUORO FOR SURGICAL PROCEDURES

## 2022-05-26 PROCEDURE — 3600000059 HC PAIN LEVEL 5 ADDL 15 MIN: Performed by: PAIN MEDICINE

## 2022-05-26 PROCEDURE — 2500000003 HC RX 250 WO HCPCS: Performed by: PAIN MEDICINE

## 2022-05-26 PROCEDURE — 7100000010 HC PHASE II RECOVERY - FIRST 15 MIN: Performed by: PAIN MEDICINE

## 2022-05-26 PROCEDURE — 3700000000 HC ANESTHESIA ATTENDED CARE: Performed by: PAIN MEDICINE

## 2022-05-26 PROCEDURE — 64493 INJ PARAVERT F JNT L/S 1 LEV: CPT | Performed by: PAIN MEDICINE

## 2022-05-26 RX ORDER — PROPOFOL 10 MG/ML
INJECTION, EMULSION INTRAVENOUS PRN
Status: DISCONTINUED | OUTPATIENT
Start: 2022-05-26 | End: 2022-05-26 | Stop reason: SDUPTHER

## 2022-05-26 RX ORDER — LIDOCAINE HYDROCHLORIDE 10 MG/ML
INJECTION, SOLUTION INFILTRATION; PERINEURAL PRN
Status: DISCONTINUED | OUTPATIENT
Start: 2022-05-26 | End: 2022-05-26 | Stop reason: ALTCHOICE

## 2022-05-26 RX ORDER — BUPIVACAINE HYDROCHLORIDE 2.5 MG/ML
INJECTION, SOLUTION EPIDURAL; INFILTRATION; INTRACAUDAL PRN
Status: DISCONTINUED | OUTPATIENT
Start: 2022-05-26 | End: 2022-05-26 | Stop reason: ALTCHOICE

## 2022-05-26 RX ORDER — METHYLPREDNISOLONE ACETATE 80 MG/ML
INJECTION, SUSPENSION INTRA-ARTICULAR; INTRALESIONAL; INTRAMUSCULAR; SOFT TISSUE PRN
Status: DISCONTINUED | OUTPATIENT
Start: 2022-05-26 | End: 2022-05-26 | Stop reason: ALTCHOICE

## 2022-05-26 RX ADMIN — PROPOFOL 20 MG: 10 INJECTION, EMULSION INTRAVENOUS at 09:25

## 2022-05-26 RX ADMIN — PROPOFOL 20 MG: 10 INJECTION, EMULSION INTRAVENOUS at 09:29

## 2022-05-26 RX ADMIN — PROPOFOL 100 MG: 10 INJECTION, EMULSION INTRAVENOUS at 09:23

## 2022-05-26 RX ADMIN — PROPOFOL 40 MG: 10 INJECTION, EMULSION INTRAVENOUS at 09:27

## 2022-05-26 ASSESSMENT — PAIN - FUNCTIONAL ASSESSMENT
PAIN_FUNCTIONAL_ASSESSMENT: 0-10
PAIN_FUNCTIONAL_ASSESSMENT: PREVENTS OR INTERFERES SOME ACTIVE ACTIVITIES AND ADLS

## 2022-05-26 ASSESSMENT — PAIN DESCRIPTION - DESCRIPTORS: DESCRIPTORS: ACHING

## 2022-05-26 ASSESSMENT — PAIN SCALES - GENERAL: PAINLEVEL_OUTOF10: 0

## 2022-05-26 NOTE — PROGRESS NOTES
5878 Patient arrived to phase II via cart. Spontaneous respiraitons even and unlabored. Placed on monitor--VSS. Report received from [address] [place] [address] Assessment completed. Patient is alert and oriented x4. IV capped off-- no complications. Patient denies pain--will monitor. Injection sites clean and dry. 0936 /68 SPO2 97 %  0938 Snack and drink provided. Pt. Denies all other needs. Side rails up X2. Call light left within reach  [phone] RN at bedside. Pt. Tolerating PO intake well. Pt. Denies all other needs. 0996 INT removed. No complications noted. 0957 Pt. States readiness to be discharged. 4415 Pt. Getting self dressed. Family notified of discharge. 1003 Pt. Ambulated to private vehicle in stable condition with RN at her side. Alert and oriented to person, place and time

## 2022-05-26 NOTE — ANESTHESIA POSTPROCEDURE EVALUATION
Department of Anesthesiology  Postprocedure Note    Patient: Romeo Palmer  MRN: [id number]  YOB: 1975  Date of evaluation: 5/26/2022  Time:  10:34 AM     Procedure Summary     Date: 05/26/22 Room / Location: [address] [place] [address] 03 / 138 [facility]    Anesthesia Start: 7776 Anesthesia Stop: 9593    Procedure: Lumbar Facet MBB @L4-5, 5-S1 bilateral#1, (Bilateral Back) Diagnosis: (Spondylosis of lumbar region without myelopathy or radiculopathy)    Surgeons: Ling Escoto MD Responsible Provider: Ariana Hernandez MD    Anesthesia Type: MAC ASA Status: 2          Anesthesia Type: No value filed. Alla Phase I:      Alla Phase II: Alla Score: 9    Last vitals: Reviewed and per EMR flowsheets.        Anesthesia Post Evaluation    Complications: no

## 2022-05-26 NOTE — OP NOTE
Pre-Procedure Note    Patient Name: Yamilet Mendoza   YOB: 1975  Room/Bed: [address] [place] [address] Pool/NONE  Medical Record Number: [id number]  Date: 5/26/22      Indication:  Lower back pain  Consent: On file. Vital Signs:   Vitals:    05/26/22 0831   BP: 128/81   Pulse: 84   Resp: 16   Temp: 97.2 °F (36.2 °C)   SpO2: 91%       Pre-Sedation Documentation and Exam:   Vital signs have been reviewed (see flow sheet for vitals). Sedation/ Anesthesia Plan:   MAC    Patient is an appropriate candidate for plan of sedation: yes    Preoperative Diagnosis:   L-spondylosis    Postoperative Diagnosis:  As above    Procedure Performed:  Diagnostic/Confirmatory median branch blocks at the levels of L4-5 and L5-S1 bilateral under fluoroscopic guidance # 1. Indication for the Procedure: The patient has a history of chronic low back pain that is not responding well to the conservative treatment. The patient's pain is mostly axial in nature. Pain is interfering with the activities of daily living. Physical examination revealed facet tenderness and facet loading is positive. The procedure and risks  were discussed with the patient and an informed consent was obtained. Procedure:     Patient is placed in prone position and skin over  the back was prepped and draped in sterile manner. Then using fluoroscopy the junction of the transverse process of the vertebra with the superior process of the facet joint was observed and the view was optimized. The skin and deep tissues posterior were infiltrated with 20 ml of 2% lidocaine. Then a #22-gauge 5-1/2 inch spinal needle was introduced through the skin wheal under fluoroscopy guidance such that the tip of the needle lies at the junction of the transverse process L3/L4/L5  with the superior processes of the facet joint. Then after negative aspiration a total of 12 ml of 0.25% Marcaine and depomedrol  (total 80 mg) was injected through the needles in divided doses . For L5 Median branch block the junction of the ala of  the sacrum with the superior articular process of the facet joint was taken as a reference point and L4 median branch the junction of the transverse process the L5 with the superolateral possible facet joint was taken to the point and healthy median branch the junction of the transverse process of L4 with the superior lateral process of the facet joint was taken as a reference point. For S1 median branch the most lateral and superior aspect of S1 foramina was taken as a reference point. Patient's vital signs and neurological status remained stable through  the procedure and post procedural  Period. Patient was instructed to keep track of pain for next 24 hours. every hour and bring it with next visit. Patient tollerated the procedure well and was discharged home in stable condition.     Electronically signed by Lucia Sanderson MD on 5/26/22 at 9:19 AM EDT

## 2022-05-26 NOTE — H&P
H&P    She completed PT  2/18/-4/12/2022 helped back  but increaed hip pain. US helped the most. She continues to have low back Hip knee pain .     Has had lumbar pain for many years. She works for [employer] as . On her feet a lot with bending heavy lifting standing at bench. She has seen Dr Mary Dodd in past, She did not care for his care and never went back.  She denies any trauma  fall or MVC. She has had Endometriosis with hysterectomy with weight gain but had back pain prior to that. She has constant lumbar pain  radiates into bilateral Hips SI and down RLE stops above the knee.    She was taking Baclofen interacted with Topamax for migraines, Mobic. Zomig didn't work for her migraines. She complains of hip and knee pain also.   Patient pain increases with bending, lifting, twisting , turning torso, standing and housework or working at job. Treatments tried ice, heat, Chiropractor, NSAIDS, muscle relaxer, OTC rubs creams patches and TENS  Pain description sharp and aching        She denies any ER visits or new health issues since last visit.     Pain scale with out pain medications or at its worst is 4/10. Pain scale with pain medications or at its best is 3/10.     Radiology:     Partial sacralization of L5 with a pseudoarthrosis on the left. Moderate degenerative facet arthropathy L4-5 bilaterally. Mild disc space narrowing L4-5.   2. Otherwise normal lumbar spine. No fracture. Sacroiliac joints unremarkable.             FINDINGS:        No fracture is identified. The bony mineralization is appropriate. The alignment is preserved. Mild bilateral tricompartmental joint space narrowing and marginal spurring is most pronounced in the patellofemoral compartment.       The visualized soft tissues are unremarkable.           Impression   No fracture or acute bony abnormality noted in either knee. Chronic arthritic changes are discussed.             FINDINGS:        No fracture is identified.  The bony mineralization is appropriate. The alignment is preserved. Mild bilateral tricompartmental joint space narrowing and marginal spurring is most pronounced in the patellofemoral compartment.       The visualized soft tissues are unremarkable.           Impression   No fracture or acute bony abnormality noted in either knee. Chronic arthritic changes are discussed.                 : There is normal osseous alignment without visualized fracture. Joint spaces appear preserved. No significant degenerative changes of the hips are identified.           Impression    Normal hip series.                      The patientis allergic to bactrim [sulfamethoxazole-trimethoprim] and vicodin [hydrocodone-acetaminophen].       Subjective:      Review of Systems   Constitutional: Positive for activity change. Negative for appetite change, chills, diaphoresis, fatigue, fever and unexpected weight change. HENT: Negative for congestion, ear pain, hearing loss, mouth sores, nosebleeds, rhinorrhea, sinus pressure and sore throat. Eyes: Negative for photophobia, pain and visual disturbance. Respiratory: Negative for cough, chest tightness, shortness of breath and wheezing. ASTHMA   Cardiovascular: Negative for chest pain and palpitations. HTN HLD   Gastrointestinal: Negative for abdominal pain, constipation, diarrhea, nausea and vomiting. Endocrine: Negative for cold intolerance, heat intolerance, polydipsia, polyphagia and polyuria. Genitourinary: Negative for decreased urine volume, difficulty urinating, frequency and hematuria. Musculoskeletal: Positive for arthralgias, back pain, gait problem and myalgias. Negative for joint swelling, neck pain and neck stiffness. Skin: Negative for color change and rash. Allergic/Immunologic: Negative for food allergies and immunocompromised state. Neurological: Positive for headaches.  Negative for dizziness, tremors, seizures, syncope, facial asymmetry, speech difficulty, weakness, light-headedness and numbness. Has Migraines Dr [facility]   Hematological: Does not bruise/bleed easily. Psychiatric/Behavioral: Negative for agitation, behavioral problems, confusion, decreased concentration, dysphoric mood, hallucinations, self-injury, sleep disturbance and suicidal ideas. The patient is nervous/anxious and is hyperactive. ADHD         Objective:      Vitals       Vitals:     05/04/22 1550   BP: 130/76   Site: Left Upper Arm   Position: Sitting   Cuff Size: Large Adult   Pulse: 64   Weight: 215 lb (97.5 kg)   Height: 5' 3\" (1.6 m)            Physical Exam  Vitals and nursing note reviewed. Constitutional:       General: She is not in acute distress. Appearance: She is well-developed. She is not diaphoretic. HENT:      Head: Normocephalic and atraumatic. Right Ear: External ear normal.      Left Ear: External ear normal.      Nose: Nose normal.      Mouth/Throat:      Pharynx: No oropharyngeal exudate. Eyes:      General: No scleral icterus. Right eye: No discharge. Left eye: No discharge. Conjunctiva/sclera: Conjunctivae normal.      Pupils: Pupils are equal, round, and reactive to light. Neck:      Thyroid: No thyromegaly. Cardiovascular:      Rate and Rhythm: Normal rate and regular rhythm. Heart sounds: Normal heart sounds. No murmur heard. No friction rub. No gallop. Pulmonary:      Effort: Pulmonary effort is normal. No respiratory distress. Breath sounds: Normal breath sounds. No wheezing or rales. Chest:      Chest wall: No tenderness. Abdominal:      General: Bowel sounds are normal. There is no distension. Palpations: Abdomen is soft. Tenderness: There is no abdominal tenderness. There is no guarding or rebound. Musculoskeletal:      Right elbow: Decreased range of motion. Tenderness present.       Cervical back: Full passive range of motion without pain, normal range of motion and neck supple. No edema, erythema or rigidity. No muscular tenderness. Normal range of motion. Thoracic back: Spasms, tenderness and bony tenderness present. Decreased range of motion. Lumbar back: Spasms, tenderness and bony tenderness present. Decreased range of motion. Back:       Right hip: Tenderness and bony tenderness present. Left hip: Tenderness and bony tenderness present. Right knee: Bony tenderness and crepitus present. Decreased range of motion. Tenderness present. Left knee: Bony tenderness and crepitus present. Decreased range of motion. Tenderness present. Skin:     General: Skin is warm. Coloration: Skin is not pale. Findings: No erythema or rash. Neurological:      Mental Status: She is alert and oriented to person, place, and time. She is not disoriented. Cranial Nerves: No cranial nerve deficit. Sensory: No sensory deficit. Motor: No atrophy or abnormal muscle tone. Coordination: Coordination normal.      Gait: Gait normal.      Deep Tendon Reflexes: Reflexes are normal and symmetric. Babinski sign absent on the right side. Psychiatric:         Attention and Perception: Attention and perception normal. She is attentive. Mood and Affect: Mood and affect normal. Mood is not anxious or depressed. Affect is not labile, blunt, angry or inappropriate. Speech: Speech normal. She is communicative. Speech is not rapid and pressured, delayed, slurred or tangential.         Behavior: Behavior normal. Behavior is not agitated, slowed, aggressive, withdrawn, hyperactive or combative. Behavior is cooperative. Thought Content: Thought content normal. Thought content is not paranoid or delusional. Thought content does not include homicidal or suicidal ideation. Thought content does not include homicidal or suicidal plan. Cognition and Memory: Cognition and memory normal. Memory is not impaired.  She does not exhibit impaired recent memory or impaired remote memory. Judgment: Judgment normal. Judgment is not impulsive or inappropriate.         GURWINDER  Patricks test  positive  Yeoman's  or Gaenslen's positive  Kemps  positive  Spurlings  na  Orozco's na        Assessment:      1. Spondylosis of lumbar region without myelopathy or radiculopathy    2. Chronic pain syndrome    3. Hip pain    4. Sacroiliac joint dysfunction    5. Chronic pain of both knees    6. LETY positive    7. CRP elevated       Plan:      · OARRS reviewed. Current MED: 0  · Patient was not offered naloxone for home. · Testing Labs or Radiology reviewed: hip knee  Rheumatology labs  reviewed  · Procedures:discussed Lumbar facet  L4-5,5-S1 bilateral #1  · Discussed with patient about risks with procedure including infection, reaction to medication, increased pain, or bleeding.   · Medications:Naproxen Zanaflex   · Discussed Rheum referral if appropriate for  detected LETY and elevated CRP             Return for Lumbar Facet MBB @L4-5, 5-S1 bilateral#1, Follow up after procedure.

## 2022-05-26 NOTE — ANESTHESIA PRE PROCEDURE
Department of Anesthesiology  Preprocedure Note       Name:  Marybeth Gibbons   Age:  50 y.o.  :  1975                                          MRN:  [id number]         Date:  2022      Surgeon: Sean Santiago):  Tobi Torres MD    Procedure: Procedure(s):  Lumbar Facet MBB @L4-5, 5-S1 bilateral#1,    Medications prior to admission:   Prior to Admission medications    Medication Sig Start Date End Date Taking? Authorizing Provider   aspirin EC 81 MG EC tablet Take 81 mg by mouth daily    Historical Provider, MD   naproxen (EC NAPROSYN) 500 MG EC tablet Take 1 tablet by mouth 2 times daily as needed for Pain 22  BRIAN Rodriguez CNP   Topiramate (TOPAMAX PO) Take by mouth    Historical Provider, MD   NONFORMULARY Indications: cholesterol medication     Historical Provider, MD   naproxen sodium (ALEVE) 220 MG tablet Take 1 tablet by mouth 2 times daily (with meals) 11/10/21   BRIAN To CNP   STRATTERA 40 MG capsule TAKE 1 CAPSULE DAILY 14   Ren Ortiz MD   albuterol (PROVENTIL HFA;VENTOLIN HFA) 108 (90 BASE) MCG/ACT inhaler Inhale 1 puff into the lungs every 4 hours as needed for Wheezing. 14   Ren Ortiz MD   hydrochlorothiazide (HYDRODIURIL) 12.5 MG tablet Take 1 tablet by mouth daily. 3/18/14   Ren Ortiz MD   PREMARIN 1.25 MG tablet TAKE 1 TABLET BY MOUTH DAILY 10/20/13   Ren Ortiz MD   amitriptyline (ELAVIL) 25 MG tablet Take 1 tablet by mouth nightly. 13   BRIAN Silva CNP   Nutritional Supplements (MELATONIN PO) Take  by mouth nightly. Historical Provider, MD       Current medications:    No current facility-administered medications for this encounter. Allergies:     Allergies   Allergen Reactions    Bactrim [Sulfamethoxazole-Trimethoprim] Hives    Vicodin [Hydrocodone-Acetaminophen] Nausea And Vomiting       Problem List:    Patient Active Problem List   Diagnosis Code    Hyperlipidemia E78.5    HTN (hypertension) I10    ADD (attention deficit disorder) F98.8    Migraine headache G43.909       Past Medical History:        Diagnosis Date    ADD (attention deficit disorder)     Depression     Fibromyalgia     Headache(784.0)     Hyperlipidemia     Hypertension     Obesity        Past Surgical History:        Procedure Laterality Date    HYSTERECTOMY         Social History:    Social History     Tobacco Use    Smoking status: Former Smoker     Packs/day: 1.00     Years: 11.00     Pack years: 11.00     Types: Cigarettes     Quit date: 2005     Years since quittin.1    Smokeless tobacco: Never Used   Substance Use Topics    Alcohol use: Yes     Comment: occasionally                                Counseling given: Not Answered      Vital Signs (Current):   Vitals:    22 0831   BP: 128/81   Pulse: 84   Resp: 16   Temp: 97.2 °F (36.2 °C)   TempSrc: Temporal   SpO2: 91%   Weight: 209 lb (94.8 kg)   Height: 5' 3\" (1.6 m)                                              BP Readings from Last 3 Encounters:   22 128/81   22 130/76   22 124/70       NPO Status: Time of last liquid consumption:                         Time of last solid consumption:                         Date of last liquid consumption: 22                        Date of last solid food consumption: 22    BMI:   Wt Readings from Last 3 Encounters:   22 209 lb (94.8 kg)   22 215 lb (97.5 kg)   22 210 lb (95.3 kg)     Body mass index is 37.02 kg/m².     CBC:   Lab Results   Component Value Date    WBC 8.5 05/10/2022    RBC 4.10 05/10/2022    HGB 11.6 05/10/2022    HCT 36.7 05/10/2022    MCV 89.5 05/10/2022    RDW 14.0 2017     05/10/2022       CMP:   Lab Results   Component Value Date     05/10/2022    K 4.0 05/10/2022     05/10/2022    CO2 24 05/10/2022    BUN 22 05/10/2022    CREATININE 1.0 05/10/2022    LABGLOM 59 05/10/2022    GLUCOSE 100 05/10/2022    PROT 6.9 05/10/2022    CALCIUM 9.6 05/10/2022    BILITOT <0.2 05/10/2022    ALKPHOS 60 05/10/2022    AST 19 05/10/2022    ALT 16 05/10/2022       POC Tests: No results for input(s): POCGLU, POCNA, POCK, POCCL, POCBUN, POCHEMO, POCHCT in the last 72 hours. Coags: No results found for: PROTIME, INR, APTT    HCG (If Applicable): No results found for: PREGTESTUR, PREGSERUM, HCG, HCGQUANT     ABGs: No results found for: PHART, PO2ART, BTH5XVI, JHQ1EHB, BEART, Z3UVDYYH     Type & Screen (If Applicable):  No results found for: LABABO, LABRH    Drug/Infectious Status (If Applicable):  No results found for: HIV, HEPCAB    COVID-19 Screening (If Applicable): No results found for: COVID19        Anesthesia Evaluation    Airway: Mallampati: II          Dental:          Pulmonary:                              Cardiovascular:    (+) hypertension:,                   Neuro/Psych:   (+) neuromuscular disease:, headaches:, psychiatric history:            GI/Hepatic/Renal:             Endo/Other:                     Abdominal:             Vascular: Other Findings:           Anesthesia Plan      MAC     ASA 2             Anesthetic plan and risks discussed with patient.                         Winnie Gutierrez MD   5/26/2022

## 2022-05-26 NOTE — H&P
[address] [address]  History and Physical Update    Pt Name: Benedicto Bob  MRN: [id number]  YOB: 1975  Date of evaluation: 5/26/2022      I have examined the patient and reviewed the H&P/Consult and there are no changes to the patient or plans.         Electronically signed by Jennifer Caceres MD on 5/26/2022 at 8:37 AM

## 2022-06-27 ENCOUNTER — OFFICE VISIT (OUTPATIENT)
Dept: PHYSICAL MEDICINE AND REHAB | Age: 47
End: 2022-06-27
Payer: COMMERCIAL

## 2022-06-27 ENCOUNTER — TELEPHONE (OUTPATIENT)
Dept: PHYSICAL MEDICINE AND REHAB | Age: 47
End: 2022-06-27

## 2022-06-27 VITALS
DIASTOLIC BLOOD PRESSURE: 78 MMHG | SYSTOLIC BLOOD PRESSURE: 128 MMHG | HEIGHT: 63 IN | WEIGHT: 209 LBS | BODY MASS INDEX: 37.03 KG/M2

## 2022-06-27 DIAGNOSIS — M25.561 CHRONIC PAIN OF BOTH KNEES: ICD-10-CM

## 2022-06-27 DIAGNOSIS — G89.4 CHRONIC PAIN SYNDROME: ICD-10-CM

## 2022-06-27 DIAGNOSIS — G89.29 CHRONIC PAIN OF BOTH KNEES: ICD-10-CM

## 2022-06-27 DIAGNOSIS — M53.3 SACROILIAC JOINT DYSFUNCTION: ICD-10-CM

## 2022-06-27 DIAGNOSIS — M25.562 CHRONIC PAIN OF BOTH KNEES: ICD-10-CM

## 2022-06-27 DIAGNOSIS — M47.816 SPONDYLOSIS OF LUMBAR REGION WITHOUT MYELOPATHY OR RADICULOPATHY: Primary | ICD-10-CM

## 2022-06-27 DIAGNOSIS — M19.90 INFLAMMATORY ARTHRITIS: ICD-10-CM

## 2022-06-27 PROCEDURE — 99214 OFFICE O/P EST MOD 30 MIN: CPT | Performed by: NURSE PRACTITIONER

## 2022-06-27 RX ORDER — FLUTICASONE PROPIONATE AND SALMETEROL 100; 50 UG/1; UG/1
1 POWDER RESPIRATORY (INHALATION) EVERY 12 HOURS
COMMUNITY

## 2022-06-27 ASSESSMENT — ENCOUNTER SYMPTOMS
CHEST TIGHTNESS: 0
EYE PAIN: 0
COLOR CHANGE: 0
VOMITING: 0
SORE THROAT: 0
DIARRHEA: 0
RHINORRHEA: 0
WHEEZING: 0
SHORTNESS OF BREATH: 0
ABDOMINAL PAIN: 0
NAUSEA: 0
SINUS PRESSURE: 0
BACK PAIN: 1
PHOTOPHOBIA: 0
CONSTIPATION: 0
COUGH: 0

## 2022-06-27 NOTE — PROGRESS NOTES
[address] [place] [address] [place] [address] [facility]  Dept: [phone]  Dept Fax: [phone]: [phone]    Visit Date: 6/27/2022    Functionality Assessment/Goals Worksheet     On a scale of 0 (Does not Interfere) to 10 (Completely Interferes)     1. Which number describes how during the past week pain has interfered with       the following:  A. General Activity:  6  B. Mood: 6  C. Walking Ability:  6  D. Normal Work (Includes both work outside the home and housework):  7  E. Relations with Other People:   6  F. Sleep:   6  G. Enjoyment of Life:   6    2. Patient Prefers to Take their Pain Medications:     []  On a regular basis   [x]  Only when necessary    []  Does not take pain medications    3. What are the Patient's Goals/Expectations for Visiting Pain Management? []  Learn about my pain    []  Receive Medication   []  Physical Therapy     []  Treat Depression   [x]  Receive Injections    []  Treat Sleep   []  Deal with Anxiety and Stress   []  Treat Opoid Dependence/Addiction   []  Other:    HPI:   Mortimer Fate is a 50 y.o. female is here today for    Chief Complaint: Low back pain, Hip pain and SI pain    HPI   F/U Lumbar Facet MBB#1 @ L4-5,5-S1 bilateral 5/26/2022 states she received about 80% pain relief or benefit for 4-5 days    She completed PT  2/18/-4/12/2022 helped back  but increaed hip pain. US helped the most. She continues to have low back Hip knee pain .     Has had lumbar pain for many years. She works for [employer] as . On her feet a lot with bending heavy lifting standing at bench. She has seen Dr Morley Neither in past, She did not care for his care and never went back.  She denies any trauma  fall or MVC. She has had Endometriosis with hysterectomy with weight gain but had back pain prior to that.   She has constant lumbar pain  radiates into bilateral Hips SI and down RLE stops above the knee.    She was taking Baclofen interacted with Topamax for migraines, Mobic. Zomig didn't work for her migraines. She complains of hip and knee pain also.   Patient pain increases with bending, lifting, twisting , turning torso, standing and housework or working at job. Treatments tried ice, heat, Chiropractor, NSAIDS, muscle relaxer, OTC rubs creams patches and TENS  Pain description sharp and aching         .   She denies any ER visits or new health issues since last visit. Pain scale with out pain medications or at its worst is 7/10. Pain scale with pain medications or at its best is 4/10. Radiology:    Partial sacralization of L5 with a pseudoarthrosis on the left. Moderate degenerative facet arthropathy L4-5 bilaterally. Mild disc space narrowing L4-5.   2. Otherwise normal lumbar spine. No fracture. Sacroiliac joints unremarkable.             FINDINGS:        No fracture is identified. The bony mineralization is appropriate. The alignment is preserved. Mild bilateral tricompartmental joint space narrowing and marginal spurring is most pronounced in the patellofemoral compartment.       The visualized soft tissues are unremarkable.           Impression   No fracture or acute bony abnormality noted in either knee. Chronic arthritic changes are discussed.             FINDINGS:        No fracture is identified. The bony mineralization is appropriate. The alignment is preserved. Mild bilateral tricompartmental joint space narrowing and marginal spurring is most pronounced in the patellofemoral compartment.       The visualized soft tissues are unremarkable.           Impression   No fracture or acute bony abnormality noted in either knee. Chronic arthritic changes are discussed.                 : There is normal osseous alignment without visualized fracture. Joint spaces appear preserved.  No significant degenerative changes of the hips are identified.           Impression reviewed. Constitutional:       General: She is not in acute distress. Appearance: She is well-developed. She is not diaphoretic. HENT:      Head: Normocephalic and atraumatic. Right Ear: External ear normal.      Left Ear: External ear normal.      Nose: Nose normal.      Mouth/Throat:      Pharynx: No oropharyngeal exudate. Eyes:      General: No scleral icterus. Right eye: No discharge. Left eye: No discharge. Conjunctiva/sclera: Conjunctivae normal.      Pupils: Pupils are equal, round, and reactive to light. Neck:      Thyroid: No thyromegaly. Cardiovascular:      Rate and Rhythm: Normal rate and regular rhythm. Heart sounds: Normal heart sounds. No murmur heard. No friction rub. No gallop. Pulmonary:      Effort: Pulmonary effort is normal. No respiratory distress. Breath sounds: Normal breath sounds. No wheezing or rales. Chest:      Chest wall: No tenderness. Abdominal:      General: Bowel sounds are normal. There is no distension. Palpations: Abdomen is soft. Tenderness: There is no abdominal tenderness. There is no guarding or rebound. Musculoskeletal:      Right elbow: Decreased range of motion. Tenderness present. Cervical back: Full passive range of motion without pain, normal range of motion and neck supple. No edema, erythema or rigidity. No muscular tenderness. Normal range of motion. Thoracic back: Spasms, tenderness and bony tenderness present. Decreased range of motion. Lumbar back: Spasms, tenderness and bony tenderness present. Decreased range of motion. Back:       Right hip: Tenderness and bony tenderness present. Left hip: Tenderness and bony tenderness present. Right knee: Bony tenderness and crepitus present. Decreased range of motion. Tenderness present. Left knee: Bony tenderness and crepitus present. Decreased range of motion. Tenderness present. Skin:     General: Skin is warm. Coloration: Skin is not pale. Findings: No erythema or rash. Neurological:      Mental Status: She is alert and oriented to person, place, and time. She is not disoriented. Cranial Nerves: No cranial nerve deficit. Sensory: No sensory deficit. Motor: No atrophy or abnormal muscle tone. Coordination: Coordination normal.      Gait: Gait normal.      Deep Tendon Reflexes: Reflexes are normal and symmetric. Babinski sign absent on the right side. Psychiatric:         Attention and Perception: Attention and perception normal. She is attentive. Mood and Affect: Mood and affect normal. Mood is not anxious or depressed. Affect is not labile, blunt, angry or inappropriate. Speech: Speech normal. She is communicative. Speech is not rapid and pressured, delayed, slurred or tangential.         Behavior: Behavior normal. Behavior is not agitated, slowed, aggressive, withdrawn, hyperactive or combative. Behavior is cooperative. Thought Content: Thought content normal. Thought content is not paranoid or delusional. Thought content does not include homicidal or suicidal ideation. Thought content does not include homicidal or suicidal plan. Cognition and Memory: Cognition and memory normal. Memory is not impaired. She does not exhibit impaired recent memory or impaired remote memory. Judgment: Judgment normal. Judgment is not impulsive or inappropriate. GURWINDER  Patricks test  positive  Yeoman's  or Gaenslen's positive  Kemps  positive  Spurlings  na  Orozco's na         Assessment:     1. Spondylosis of lumbar region without myelopathy or radiculopathy    2. Sacroiliac joint dysfunction    3. Chronic pain of both knees    4. Inflammatory arthritis    5.  Chronic pain syndrome            Plan:       Testing Labs or Radiology reviewed:    Procedures:Lumbar Facet MBB#2 L4-5,5-S1 bilateral    Discussed with patient about risks with procedure including infection, reaction to medication, increased pain, or bleeding.  Medications:Naproxen  Does not take Zanaflex states interacts with  her Topamax side effects    Has consult with Dr Nel Orlando in July       Meds. Prescribed:   No orders of the defined types were placed in this encounter. Return for Lumbar Facet MBB @L4-5, 5-S1 bilateral #2, Follow up after procedure.                Electronically signed by BRIAN Bailey CNP on6/27/2022 at 1:56 PM

## 2022-07-12 ENCOUNTER — OFFICE VISIT (OUTPATIENT)
Dept: RHEUMATOLOGY | Age: 47
End: 2022-07-12
Payer: COMMERCIAL

## 2022-07-12 ENCOUNTER — NURSE ONLY (OUTPATIENT)
Dept: LAB | Age: 47
End: 2022-07-12

## 2022-07-12 VITALS
HEIGHT: 63 IN | BODY MASS INDEX: 37.03 KG/M2 | DIASTOLIC BLOOD PRESSURE: 72 MMHG | HEART RATE: 84 BPM | WEIGHT: 209 LBS | OXYGEN SATURATION: 97 % | SYSTOLIC BLOOD PRESSURE: 126 MMHG

## 2022-07-12 DIAGNOSIS — R76.8 POSITIVE ANA (ANTINUCLEAR ANTIBODY): Primary | ICD-10-CM

## 2022-07-12 DIAGNOSIS — R76.8 POSITIVE ANA (ANTINUCLEAR ANTIBODY): ICD-10-CM

## 2022-07-12 DIAGNOSIS — L65.9 ALOPECIA: ICD-10-CM

## 2022-07-12 PROCEDURE — 99204 OFFICE O/P NEW MOD 45 MIN: CPT | Performed by: INTERNAL MEDICINE

## 2022-07-12 ASSESSMENT — ENCOUNTER SYMPTOMS
WHEEZING: 0
SHORTNESS OF BREATH: 1
VOMITING: 0
NAUSEA: 0
COUGH: 0
ABDOMINAL PAIN: 0

## 2022-07-12 NOTE — PROGRESS NOTES
[facility]) Physicians Rheumatology  Rheumatology Consult Note      7/12/2022       Reason for Consult:  Positive LETY  Requesting Physician:  BRIAN Elaine - *     CHIEF COMPLAINT:    Chief Complaint   Patient presents with    New Patient     new pt LETY positive CRP elevated          History Obtained From:  patient      HISTORY OF PRESENT ILLNESS:    50 y.o. female presents today for evaluation of positive LETY. This was prompted by complaints of jont pain. Reports having \"arthritis\" since her 17's. Pain is mostly in her knees, hips. Sometimes has pain in her elbows and wrist. No associated joint swelling. No joint pain at time of visit. Changes in weather and prolonged walking aggravates the pain. Improves with rest. Advil eases her knee pain. At its worst, pain may be as high as 6/10 in pain scale. Reports living an active life. She enjoys hiking. Reports having hair thinning on her scalp and hair loss in her right eye brow. No skin rash or photosensitivity. No oral ulcers. Has rare episodes of \"bumps\" on the lining of her cheeks in her mouth. No h/o pleurisy or pericarditis. No Raynaud's. Past Medical History:     has a past medical history of ADD (attention deficit disorder), Depression, Fibromyalgia, Headache(784.0), Hyperlipidemia, Hypertension, and Obesity. Past Surgical History:     has a past surgical history that includes Hysterectomy and Pain management procedure (Bilateral, 5/26/2022).     Current Medications:      Current Outpatient Medications:     fluticasone-salmeterol (ADVIAR) 100-50 MCG/ACT AEPB diskus inhaler, Inhale 1 puff into the lungs every 12 hours, Disp: , Rfl:     aspirin EC 81 MG EC tablet, Take 81 mg by mouth daily, Disp: , Rfl:     Topiramate (TOPAMAX PO), Take by mouth, Disp: , Rfl:     NONFORMULARY, Indications: cholesterol medication , Disp: , Rfl:     naproxen sodium (ALEVE) 220 MG tablet, Take 1 tablet by mouth 2 times daily (with meals), Disp: 60 tablet, Rfl: 3    STRATTERA 40 MG capsule, TAKE 1 CAPSULE DAILY, Disp: 90 capsule, Rfl: 0    albuterol (PROVENTIL HFA;VENTOLIN HFA) 108 (90 BASE) MCG/ACT inhaler, Inhale 1 puff into the lungs every 4 hours as needed for Wheezing., Disp: 1 Inhaler, Rfl: 0    hydrochlorothiazide (HYDRODIURIL) 12.5 MG tablet, Take 1 tablet by mouth daily. , Disp: 90 tablet, Rfl: 3    PREMARIN 1.25 MG tablet, TAKE 1 TABLET BY MOUTH DAILY, Disp: 90 tablet, Rfl: 3    amitriptyline (ELAVIL) 25 MG tablet, Take 1 tablet by mouth nightly., Disp: 90 tablet, Rfl: 3    Nutritional Supplements (MELATONIN PO), Take  by mouth nightly., Disp: , Rfl:     naproxen (EC NAPROSYN) 500 MG EC tablet, Take 1 tablet by mouth 2 times daily as needed for Pain, Disp: 60 tablet, Rfl: 3    Allergies:    Bactrim [sulfamethoxazole-trimethoprim] and Vicodin [hydrocodone-acetaminophen]    Social History:     reports that she quit smoking about 17 years ago. Her smoking use included cigarettes. She has a 11.00 pack-year smoking history. She has never used smokeless tobacco. She reports current alcohol use. She reports that she does not use drugs. Family History:   family history includes Cirrhosis in her father; Depression in her father; Diabetes in her father; Heart Disease in her father; High Blood Pressure in her father; High Cholesterol in her father and mother. REVIEW OF SYSTEMS:    Review of Systems   Constitutional: Negative for chills, fatigue, fever and unexpected weight change. HENT: Negative for mouth sores (rare episodes of \"bumps\" in the mouth). Respiratory: Positive for shortness of breath (being attributed to asthma. Had her inhalers adjusted recently. ). Negative for cough and wheezing. Cardiovascular: Negative for chest pain and leg swelling. Gastrointestinal: Negative for abdominal pain, nausea and vomiting. Genitourinary: Negative for dysuria and hematuria. Skin: Negative for rash.    Neurological: Negative for headaches. PHYSICAL EXAM:    Vitals:    /72 (Site: Left Lower Arm, Position: Sitting, Cuff Size: Medium Adult)   Pulse 84   Ht 5' 2.99\" (1.6 m)   Wt 209 lb (94.8 kg)   SpO2 97%   BMI 37.03 kg/m²     Physical Exam  Constitutional:       General: She is not in acute distress. Appearance: Normal appearance. Eyes:      Extraocular Movements: Extraocular movements intact. Conjunctiva/sclera: Conjunctivae normal.   Cardiovascular:      Rate and Rhythm: Normal rate and regular rhythm. Heart sounds: Normal heart sounds. No murmur heard. No friction rub. Pulmonary:      Effort: Pulmonary effort is normal. No respiratory distress. Breath sounds: Normal breath sounds. No wheezing or rhonchi. Abdominal:      Palpations: Abdomen is soft. Musculoskeletal:         General: No swelling, tenderness or deformity. Normal range of motion. Cervical back: Normal range of motion and neck supple. Right lower leg: No edema. Left lower leg: No edema. Comments: No synovitis or tenderness in small joints of hands, wrists, elbows, shoulders. Good handgrip strength bilateral.   Lymphadenopathy:      Cervical: No cervical adenopathy. Skin:     General: Skin is warm and dry. Findings: No lesion or rash. Neurological:      General: No focal deficit present. Mental Status: She is alert and oriented to person, place, and time. Mental status is at baseline. Cranial Nerves: No cranial nerve deficit. Gait: Gait normal.   Psychiatric:         Mood and Affect: Mood normal.            DATA:    Labs were reviewed. Results for Char Bartholomew" (MRN [id number]) as of 7/12/2022 14:15   Ref.  Range 5/10/2022 15:47   Sodium Latest Ref Range: 135 - 145 meq/L 141   Potassium Latest Ref Range: 3.5 - 5.2 meq/L 4.0   Chloride Latest Ref Range: 98 - 111 meq/L 104   CO2 Latest Ref Range: 23 - 33 meq/L 24   BUN,BUNPL Latest Ref Range: 7 - 22 mg/dL 22   Creatinine Latest

## 2022-07-21 ENCOUNTER — PREP FOR PROCEDURE (OUTPATIENT)
Dept: PHYSICAL MEDICINE AND REHAB | Age: 47
End: 2022-07-21

## 2022-07-21 ENCOUNTER — TELEPHONE (OUTPATIENT)
Dept: PHYSICAL MEDICINE AND REHAB | Age: 47
End: 2022-07-21

## 2022-07-21 NOTE — TELEPHONE ENCOUNTER
Pt. Contacted. Requests procedure and follow up cancelled at this time due to finances. Will call back at a later time to reschedule.

## 2022-08-02 ENCOUNTER — OFFICE VISIT (OUTPATIENT)
Dept: RHEUMATOLOGY | Age: 47
End: 2022-08-02
Payer: COMMERCIAL

## 2022-08-02 VITALS
HEIGHT: 63 IN | BODY MASS INDEX: 36.96 KG/M2 | HEART RATE: 78 BPM | WEIGHT: 208.6 LBS | DIASTOLIC BLOOD PRESSURE: 78 MMHG | SYSTOLIC BLOOD PRESSURE: 122 MMHG | OXYGEN SATURATION: 98 %

## 2022-08-02 DIAGNOSIS — R76.8 POSITIVE ANA (ANTINUCLEAR ANTIBODY): ICD-10-CM

## 2022-08-02 DIAGNOSIS — L63.9 ALOPECIA AREATA: Primary | ICD-10-CM

## 2022-08-02 PROCEDURE — 99213 OFFICE O/P EST LOW 20 MIN: CPT | Performed by: INTERNAL MEDICINE

## 2022-08-02 ASSESSMENT — ENCOUNTER SYMPTOMS
VOMITING: 0
NAUSEA: 0
COUGH: 0
WHEEZING: 0
SHORTNESS OF BREATH: 1
ABDOMINAL PAIN: 0

## 2022-08-02 NOTE — PROGRESS NOTES
[facility]) Physicians Rheumatology  Rheumatology Consult Note      8/2/2022       Reason for Consult:  Positive LETY  Requesting Physician:  No ref. provider found     CHIEF COMPLAINT:    Chief Complaint   Patient presents with    Follow-up     3 week fu         History Obtained From:  patient      HISTORY OF PRESENT ILLNESS:    50 y.o. female presents today for evaluation of positive LETY. In last office visit, labs were ordered for further evaluation. This was prompted by complaints of jont pain. Reports that she has postponed her back injection due to high requested copay. Recap:  Reports having \"arthritis\" since her 17's. Pain is mostly in her knees, hips. Sometimes has pain in her elbows and wrist. No associated joint swelling. No joint pain at time of visit. Changes in weather and prolonged walking aggravates the pain. Improves with rest. Advil eases her knee pain. At its worst, pain may be as high as 6/10 in pain scale. Reports living an active life. She enjoys hiking. Reports having hair thinning on her scalp and hair loss in her right eye brow. No skin rash or photosensitivity. No oral ulcers. Has rare episodes of \"bumps\" on the lining of her cheeks in her mouth. No h/o pleurisy or pericarditis. No Raynaud's. Past Medical History:     has a past medical history of ADD (attention deficit disorder), Depression, Fibromyalgia, Headache(784.0), Hyperlipidemia, Hypertension, and Obesity. Past Surgical History:     has a past surgical history that includes Hysterectomy and Pain management procedure (Bilateral, 5/26/2022).     Current Medications:      Current Outpatient Medications:     fluticasone-salmeterol (ADVIAR) 100-50 MCG/ACT AEPB diskus inhaler, Inhale 1 puff into the lungs every 12 hours, Disp: , Rfl:     aspirin EC 81 MG EC tablet, Take 81 mg by mouth daily, Disp: , Rfl:     Topiramate (TOPAMAX PO), Take by mouth, Disp: , Rfl:     NONFORMULARY, Indications: cholesterol medication , Disp: , Rfl:     naproxen sodium (ALEVE) 220 MG tablet, Take 1 tablet by mouth 2 times daily (with meals), Disp: 60 tablet, Rfl: 3    STRATTERA 40 MG capsule, TAKE 1 CAPSULE DAILY, Disp: 90 capsule, Rfl: 0    albuterol (PROVENTIL HFA;VENTOLIN HFA) 108 (90 BASE) MCG/ACT inhaler, Inhale 1 puff into the lungs every 4 hours as needed for Wheezing., Disp: 1 Inhaler, Rfl: 0    hydrochlorothiazide (HYDRODIURIL) 12.5 MG tablet, Take 1 tablet by mouth daily. , Disp: 90 tablet, Rfl: 3    PREMARIN 1.25 MG tablet, TAKE 1 TABLET BY MOUTH DAILY, Disp: 90 tablet, Rfl: 3    amitriptyline (ELAVIL) 25 MG tablet, Take 1 tablet by mouth nightly., Disp: 90 tablet, Rfl: 3    Nutritional Supplements (MELATONIN PO), Take  by mouth nightly., Disp: , Rfl:     naproxen (EC NAPROSYN) 500 MG EC tablet, Take 1 tablet by mouth 2 times daily as needed for Pain, Disp: 60 tablet, Rfl: 3    Allergies:    Bactrim [sulfamethoxazole-trimethoprim] and Vicodin [hydrocodone-acetaminophen]    Social History:     reports that she quit smoking about 17 years ago. Her smoking use included cigarettes. She has a 11.00 pack-year smoking history. She has never used smokeless tobacco. She reports current alcohol use. She reports that she does not use drugs. Family History:   family history includes Cirrhosis in her father; Depression in her father; Diabetes in her father; Heart Disease in her father; High Blood Pressure in her father; High Cholesterol in her father and mother. REVIEW OF SYSTEMS:    Review of Systems   Constitutional:  Positive for fatigue. Negative for fever. HENT:  Negative for mouth sores (rare episodes of \"bumps\" in the mouth). Respiratory:  Positive for shortness of breath (being attributed to asthma. Had her inhalers adjusted recently. ). Negative for cough and wheezing. Cardiovascular:  Negative for chest pain. Gastrointestinal:  Negative for abdominal pain, nausea and vomiting. Skin:  Negative for rash. PHYSICAL EXAM:    Vitals:    /78 (Site: Left Upper Arm, Position: Sitting, Cuff Size: Medium Adult)   Pulse 78   Ht 5' 2.99\" (1.6 m)   Wt 208 lb 9.6 oz (94.6 kg)   SpO2 98%   BMI 36.96 kg/m²     Physical Exam  Constitutional:       General: She is not in acute distress. Appearance: Normal appearance. Eyes:      Conjunctiva/sclera: Conjunctivae normal.   Pulmonary:      Effort: Pulmonary effort is normal.   Musculoskeletal:         General: No swelling, tenderness or deformity. Comments: No synovitis or tenderness in small joints of hands, wrists, elbows, shoulders. Good handgrip strength bilateral.   Skin:     General: Skin is dry. Findings: No rash. Neurological:      Mental Status: She is alert and oriented to person, place, and time. Mental status is at baseline. Gait: Gait normal.   Psychiatric:         Mood and Affect: Mood normal.          DATA:    Labs were reviewed. IMPRESSION/RECOMMENDATIONS:      1. Positive LETY, very low titer. Reviewed with pt her lab results. Reassured pt that level of suspicion of systemic rheumatic autoimmune disease is low. 2. Alopecia areata: will refer to dermatology. 3. Back pain is likely related to degenerative disc disease rather than an inflammatory arthritis. Answered pt's questions and concerns. RTC prn. No orders of the defined types were placed in this encounter.        Sara Chanel MD    [address] [facility]  [address] [facility]. [address] [facility]  [address]  [phone]

## 2023-04-17 ENCOUNTER — OFFICE VISIT (OUTPATIENT)
Dept: FAMILY MEDICINE CLINIC | Age: 48
End: 2023-04-17
Payer: COMMERCIAL

## 2023-04-17 VITALS
RESPIRATION RATE: 16 BRPM | WEIGHT: 220.2 LBS | DIASTOLIC BLOOD PRESSURE: 80 MMHG | HEIGHT: 63 IN | HEART RATE: 71 BPM | TEMPERATURE: 97.2 F | SYSTOLIC BLOOD PRESSURE: 124 MMHG | OXYGEN SATURATION: 99 % | BODY MASS INDEX: 39.02 KG/M2

## 2023-04-17 DIAGNOSIS — S06.0X0A CONCUSSION WITHOUT LOSS OF CONSCIOUSNESS, INITIAL ENCOUNTER: Primary | ICD-10-CM

## 2023-04-17 DIAGNOSIS — F98.8 ATTENTION DEFICIT DISORDER (ADD) WITHOUT HYPERACTIVITY: ICD-10-CM

## 2023-04-17 DIAGNOSIS — G43.809 OTHER MIGRAINE WITHOUT STATUS MIGRAINOSUS, NOT INTRACTABLE: ICD-10-CM

## 2023-04-17 PROCEDURE — 3074F SYST BP LT 130 MM HG: CPT | Performed by: FAMILY MEDICINE

## 2023-04-17 PROCEDURE — 99213 OFFICE O/P EST LOW 20 MIN: CPT | Performed by: FAMILY MEDICINE

## 2023-04-17 PROCEDURE — 3079F DIAST BP 80-89 MM HG: CPT | Performed by: FAMILY MEDICINE

## 2023-04-17 RX ORDER — FENOFIBRATE 145 MG/1
TABLET, COATED ORAL
COMMUNITY
Start: 2023-01-14

## 2023-04-17 RX ORDER — ZOLMITRIPTAN 5 MG/1
TABLET, FILM COATED ORAL
COMMUNITY
Start: 2023-03-12

## 2023-04-17 RX ORDER — PROPRANOLOL HYDROCHLORIDE 20 MG/1
TABLET ORAL
COMMUNITY
Start: 2023-03-21

## 2023-04-17 SDOH — ECONOMIC STABILITY: FOOD INSECURITY: WITHIN THE PAST 12 MONTHS, YOU WORRIED THAT YOUR FOOD WOULD RUN OUT BEFORE YOU GOT MONEY TO BUY MORE.: NEVER TRUE

## 2023-04-17 SDOH — ECONOMIC STABILITY: INCOME INSECURITY: HOW HARD IS IT FOR YOU TO PAY FOR THE VERY BASICS LIKE FOOD, HOUSING, MEDICAL CARE, AND HEATING?: NOT HARD AT ALL

## 2023-04-17 SDOH — ECONOMIC STABILITY: FOOD INSECURITY: WITHIN THE PAST 12 MONTHS, THE FOOD YOU BOUGHT JUST DIDN'T LAST AND YOU DIDN'T HAVE MONEY TO GET MORE.: NEVER TRUE

## 2023-04-17 SDOH — ECONOMIC STABILITY: HOUSING INSECURITY
IN THE LAST 12 MONTHS, WAS THERE A TIME WHEN YOU DID NOT HAVE A STEADY PLACE TO SLEEP OR SLEPT IN A SHELTER (INCLUDING NOW)?: NO

## 2023-04-17 ASSESSMENT — PATIENT HEALTH QUESTIONNAIRE - PHQ9
1. LITTLE INTEREST OR PLEASURE IN DOING THINGS: 0
SUM OF ALL RESPONSES TO PHQ QUESTIONS 1-9: 0
SUM OF ALL RESPONSES TO PHQ9 QUESTIONS 1 & 2: 0
SUM OF ALL RESPONSES TO PHQ QUESTIONS 1-9: 0
2. FEELING DOWN, DEPRESSED OR HOPELESS: 0

## 2023-04-17 NOTE — PROGRESS NOTES
Attending Supervising [de-identified] Attestation Statement  The patient is a 46 y.o. female. I have performed a history and physical examination of the patient. I discussed the case with the resident physician. I reviewed the patient's Past Medical History, Past Surgical History, Medications, and Allergies. Physical Exam:  Vitals:    04/17/23 1133   BP: 124/80   Pulse: 71   Resp: 16   Temp: 97.2 °F (36.2 °C)   SpO2: 99%   Weight: 220 lb 3.2 oz (99.9 kg)   Height: 5' 3\" (1.6 m)       Alert. No distress. Flat affect  No respiratory distress. Normal respiratory effort. Positive Romberg  Positive VOR and HOR    Impression/Plan  I reviewed and agree with the findings and plan documented in his note . Diagnosis Orders   1. Concussion without loss of consciousness, initial encounter        2. Other migraine without status migrainosus, not intractable        3. Attention deficit disorder (ADD) without hyperactivity          Concussion: Acute complicated injury. Status post 2 days since injury. Head CT is not indicated. Very symptomatic. Recommend conservative treatment as below.     Modifying factors: Migraines, ADD, history of sleep problems    -tylenol  -limit driving  -ok to work as tolerated  -concussion handout  -continue inderal, topamax (50 mg 2x per day), and elavil.  -continue straterra    F/u about 10 days    Electronically signed by Marvin Avalos MD on 4/17/23 at 12:05 PM EDT
tired appearance. HEENT: Pupils equal, round, and reactive to light. Conjunctivae/corneas clear. Neck: Supple, with full range of motion. No jugular venous distention. Trachea midline. Respiratory:  Normal respiratory effort. Cardiovascular: Regular rate and rhythm. Musculoskeletal: passive and active ROM x 4 extremities. Skin: Skin color, texture, turgor normal.  No rashes or lesions. Neurologic:  Neurovascularly intact without any focal sensory/motor deficits. Cranial nerves: II-XII intact, grossly non-focal.  Able to spell \"WORLD\" backwards and recite months of the year backwards. HOR and VOR positive. Properly performed finger-nose exam.  Mild balance problem on testing. Psychiatric: Alert and oriented, thought content appropriate, normal insight      Vitals:    04/17/23 1133   BP: 124/80   Pulse: 71   Resp: 16   Temp: 97.2 °F (36.2 °C)   SpO2: 99%   Weight: 220 lb 3.2 oz (99.9 kg)   Height: 5' 3\" (1.6 m)         An electronic signature was used to authenticate this note.     --Willie Kauffman MD

## 2023-04-17 NOTE — PATIENT INSTRUCTIONS
Concussion Management    What is a concussion? A concussion is a disturbance in brain function due to a traumatic event such as a bump, blow, or jolt to the head or from a whiplash type of injury. An athlete with a concussion needs immediate attention in the emergency department if he/she  - Has a headache that gets worse   - Is very drowsy or cant be awakened  - Cant recognize people or places  - Has repeated vomiting  - Behaves unusually or seems confused, very irritable  - Has seizures (arms and legs jerk uncontrollably)  - Has weak or numb arms or legs    What can be done to help with the symptoms? After a concussion, the brain needs rest--mental and physical rest.  Mental rest  Limit texting, reading, video games, television, and other mentally taxing activities. Limit testing and homework until symptoms resolve. Allow extra time between classes. Physical rest  No physical activity until symptom-free. No weight lifting, running, or playing sports. Gets lots of rest.  Be sure to get enough sleep. Take naps during the day if needed. No driving a car, moped, or heavy equipment. Drink enough fluids to stay hydrated. Do not drink alcohol or use other drugs during the recovery process. What medications are okay to take? You can take Tylenol as needed for headache. Avoid aspirin.

## 2023-04-27 ENCOUNTER — OFFICE VISIT (OUTPATIENT)
Dept: FAMILY MEDICINE CLINIC | Age: 48
End: 2023-04-27
Payer: COMMERCIAL

## 2023-04-27 VITALS
HEART RATE: 72 BPM | DIASTOLIC BLOOD PRESSURE: 80 MMHG | SYSTOLIC BLOOD PRESSURE: 126 MMHG | BODY MASS INDEX: 38.77 KG/M2 | WEIGHT: 218.8 LBS | RESPIRATION RATE: 16 BRPM | TEMPERATURE: 97.2 F | HEIGHT: 63 IN | OXYGEN SATURATION: 98 %

## 2023-04-27 DIAGNOSIS — S06.0X0A CONCUSSION WITHOUT LOSS OF CONSCIOUSNESS, INITIAL ENCOUNTER: Primary | ICD-10-CM

## 2023-04-27 DIAGNOSIS — G47.09 OTHER INSOMNIA: ICD-10-CM

## 2023-04-27 DIAGNOSIS — F39 MOOD DISORDER (HCC): ICD-10-CM

## 2023-04-27 PROCEDURE — 3079F DIAST BP 80-89 MM HG: CPT | Performed by: FAMILY MEDICINE

## 2023-04-27 PROCEDURE — 3074F SYST BP LT 130 MM HG: CPT | Performed by: FAMILY MEDICINE

## 2023-04-27 PROCEDURE — 99214 OFFICE O/P EST MOD 30 MIN: CPT | Performed by: FAMILY MEDICINE

## 2023-04-27 RX ORDER — AMITRIPTYLINE HYDROCHLORIDE 50 MG/1
50 TABLET, FILM COATED ORAL NIGHTLY
Qty: 30 TABLET | Refills: 0 | Status: SHIPPED | OUTPATIENT
Start: 2023-04-27

## 2023-04-27 RX ORDER — BUTALBITAL, ACETAMINOPHEN AND CAFFEINE 50; 325; 40 MG/1; MG/1; MG/1
TABLET ORAL
COMMUNITY
Start: 2023-04-25

## 2023-04-27 NOTE — PROGRESS NOTES
[facility] PROFESSIONAL SERVS  [facility]  [address] [facility]  Dept: [phone]  Dept Fax: [phone]: [phone]    Chief Complaint   Patient presents with    Concussion     Things are better, still having headaches when concentrating or looking at her phone to much, trouble sleeping, emotional, and irritable. Injury date 4/15/23    History:      Vinita Wharton is a 46 y.o. female who presents in 10 day follow-up for concussion. Improving symptoms    Taking meds. Using tylenol and fioricet. Headaches are intermittent and improving. Not sleeping well. Sleeps 4-6 hours per night. More emotional. Crying more. Fuzzy thinking yet  Using phone more    Writes wrong numbers at work sometimes    SCAT 5 Symptoms (score 0-6)  Headache:     1  \"Pressure in head\":  0  Neck Pain:     1  Nausea or vomitin  Dizziness:     0  Blurred vision:    0  Balance problems:    0  Sensitivity to light:    1  Sensitivity to noise:    0  Feeling slowed down:  1  Feeling like \"in a fog\":  1  \"Don't feel right\":   1  Difficulty concentratin  Difficulty rememberin  Fatigue or low energy: 1  Confusion:     1  Drowsiness:   1  More emotional:  2  Irritability:   2  Sadness:   1  Nervous or anxious:  0  Trouble falling asleep:  2    Current Outpatient Medications   Medication Sig Dispense Refill    butalbital-acetaminophen-caffeine (FIORICET, ESGIC) -40 MG per tablet       ZOLMitriptan (ZOMIG) 5 MG tablet       propranolol (INDERAL) 20 MG tablet       fenofibrate (TRICOR) 145 MG tablet       fluticasone-salmeterol (ADVIAR) 100-50 MCG/ACT AEPB diskus inhaler Inhale 1 puff into the lungs in the morning and 1 puff in the evening.       Topiramate (TOPAMAX PO) Take by mouth      NONFORMULARY Indications: cholesterol medication       naproxen sodium (ALEVE) 220 MG tablet Take 1 tablet by mouth 2 times daily (with meals) 60 tablet 3

## 2023-05-11 ENCOUNTER — OFFICE VISIT (OUTPATIENT)
Dept: FAMILY MEDICINE CLINIC | Age: 48
End: 2023-05-11
Payer: COMMERCIAL

## 2023-05-11 VITALS
HEIGHT: 63 IN | RESPIRATION RATE: 16 BRPM | BODY MASS INDEX: 39.9 KG/M2 | SYSTOLIC BLOOD PRESSURE: 122 MMHG | DIASTOLIC BLOOD PRESSURE: 82 MMHG | WEIGHT: 225.2 LBS | HEART RATE: 80 BPM | OXYGEN SATURATION: 98 % | TEMPERATURE: 97.2 F

## 2023-05-11 DIAGNOSIS — F39 MOOD DISORDER (HCC): ICD-10-CM

## 2023-05-11 DIAGNOSIS — G47.09 OTHER INSOMNIA: ICD-10-CM

## 2023-05-11 DIAGNOSIS — S06.0X0A CONCUSSION WITHOUT LOSS OF CONSCIOUSNESS, INITIAL ENCOUNTER: Primary | ICD-10-CM

## 2023-05-11 PROCEDURE — 3079F DIAST BP 80-89 MM HG: CPT | Performed by: FAMILY MEDICINE

## 2023-05-11 PROCEDURE — 3074F SYST BP LT 130 MM HG: CPT | Performed by: FAMILY MEDICINE

## 2023-05-11 PROCEDURE — 99213 OFFICE O/P EST LOW 20 MIN: CPT | Performed by: FAMILY MEDICINE

## 2023-05-11 RX ORDER — AMITRIPTYLINE HYDROCHLORIDE 50 MG/1
50 TABLET, FILM COATED ORAL NIGHTLY
Qty: 30 TABLET | Refills: 0 | Status: SHIPPED | OUTPATIENT
Start: 2023-05-11

## 2023-05-11 ASSESSMENT — ENCOUNTER SYMPTOMS
RESPIRATORY NEGATIVE: 1
PHOTOPHOBIA: 0
GASTROINTESTINAL NEGATIVE: 1

## 2023-05-11 NOTE — PROGRESS NOTES
Attending Supervising Physician's Attestation Statement  I performed a history and physical examination on the patient and discussed the management with the resident physician. I reviewed and agree with the findings and plan as documented in his note . Diagnosis Orders   1. Concussion without loss of consciousness, initial encounter  amitriptyline (ELAVIL) 50 MG tablet      2. Other insomnia  amitriptyline (ELAVIL) 50 MG tablet      3. Mood disorder (HCC)  amitriptyline (ELAVIL) 50 MG tablet        Caution: Status post 4 weeks. Doing well. Still mildly symptomatic but improving. Try decreasing Elavil to 25 mg at some point in the next 1 to 2 months.   She will follow-up with us as needed  Refill elavil  Transition back to PCP  F/u prn    Electronically signed by Maegan Byrd MD on 5/11/23 at 11:43 AM EDT

## 2023-05-11 NOTE — PROGRESS NOTES
[facility] PROFESSIONAL [facility]  [address] [place] [address]  Dept: [phone]  Dept Fax: [phone]  Loc: [phone]      Assessment & Plan     1. Concussion without loss of consciousness, initial encounter    2. Other insomnia    3. Mood disorder ([address]       Orders Placed This Encounter    amitriptyline (ELAVIL) 50 MG tablet     Sig: Take 1 tablet by mouth nightly     Dispense:  30 tablet     Refill:  0     - Refilled elavil. Can potentially go back down to 25 mg daily elavil if patient continues to feel well with good headache control.  - given significant symptom improvement, discussed with patient and she is agreeable to follow up as needed in future or if symptoms do not improve or worsen. Return if symptoms worsen or fail to improve. History of Present Illness   Reason for Appointment:   Chief Complaint   Patient presents with    Concussion     No issues/concerns things are better. Octavia Grill is a 46 y.o. female who presents today for:    Difficulty w/ sleep and emotional lability is improving. Headache frequency and severity are also improving. Feels that increase in elavil dosage has helped. Work and concentration is better. But still somewhat distractable. SCAT 5 symptoms score  = 6 today, much improved compared to prior. Review of Systems   Constitutional: Negative. HENT: Negative. Eyes:  Negative for photophobia and visual disturbance. Respiratory: Negative. Cardiovascular: Negative. Gastrointestinal: Negative. Neurological:  Positive for headaches. Negative for dizziness, facial asymmetry, speech difficulty and light-headedness. Psychiatric/Behavioral:  Positive for decreased concentration. Negative for confusion, dysphoric mood, hallucinations, sleep disturbance and suicidal ideas. The patient is not nervous/anxious and is not hyperactive.          PMH    Patient Active Problem List

## 2023-06-13 ENCOUNTER — HOSPITAL ENCOUNTER (EMERGENCY)
Age: 48
Discharge: HOME | End: 2023-06-13
Payer: COMMERCIAL

## 2023-06-13 VITALS
OXYGEN SATURATION: 96 % | DIASTOLIC BLOOD PRESSURE: 96 MMHG | HEART RATE: 110 BPM | SYSTOLIC BLOOD PRESSURE: 138 MMHG | RESPIRATION RATE: 18 BRPM | TEMPERATURE: 100.1 F

## 2023-06-13 DIAGNOSIS — J06.9 ACUTE UPPER RESPIRATORY INFECTION: Primary | ICD-10-CM

## 2023-06-13 LAB
FLUAV AG SPEC QL: NEGATIVE
FLUBV AG SPEC QL: NEGATIVE
S PYO AG THROAT QL: NEGATIVE
SARS-COV-2 RDRP RESP QL NAA+PROBE: NOT  DETECTED

## 2023-06-13 PROCEDURE — 87804 INFLUENZA ASSAY W/OPTIC: CPT

## 2023-06-13 PROCEDURE — 99213 OFFICE O/P EST LOW 20 MIN: CPT | Performed by: NURSE PRACTITIONER

## 2023-06-13 PROCEDURE — 87635 SARS-COV-2 COVID-19 AMP PRB: CPT

## 2023-06-13 PROCEDURE — 99213 OFFICE O/P EST LOW 20 MIN: CPT

## 2023-06-13 PROCEDURE — 87651 STREP A DNA AMP PROBE: CPT

## 2023-06-13 RX ORDER — DEXTROMETHORPHAN HYDROBROMIDE AND PROMETHAZINE HYDROCHLORIDE 15; 6.25 MG/5ML; MG/5ML
5 SYRUP ORAL 4 TIMES DAILY PRN
Qty: 118 ML | Refills: 0 | Status: SHIPPED | OUTPATIENT
Start: 2023-06-13 | End: 2023-06-20

## 2023-06-13 RX ORDER — PREDNISONE 20 MG/1
40 TABLET ORAL DAILY
Qty: 14 TABLET | Refills: 0 | Status: SHIPPED | OUTPATIENT
Start: 2023-06-13 | End: 2023-06-20

## 2023-06-13 ASSESSMENT — ENCOUNTER SYMPTOMS
COUGH: 1
VOMITING: 0
CHEST TIGHTNESS: 1
NAUSEA: 0
DIARRHEA: 0
SHORTNESS OF BREATH: 0
SORE THROAT: 1

## 2023-06-13 ASSESSMENT — PAIN SCALES - GENERAL: PAINLEVEL_OUTOF10: 7

## 2023-06-13 ASSESSMENT — PAIN DESCRIPTION - PAIN TYPE: TYPE: ACUTE PAIN

## 2023-06-13 ASSESSMENT — PAIN - FUNCTIONAL ASSESSMENT: PAIN_FUNCTIONAL_ASSESSMENT: 0-10

## 2023-06-13 ASSESSMENT — PAIN DESCRIPTION - LOCATION: LOCATION: HEAD

## 2023-06-13 ASSESSMENT — PAIN DESCRIPTION - FREQUENCY: FREQUENCY: CONTINUOUS

## 2023-06-13 NOTE — ED NOTES
Patient discharge instructions given to pt and pt verbalized understanding, 2 px given, no other needs at this time, and pt left in stable condition.       Brodie Adame RN  06/13/23 Liliam Mahoney

## 2023-06-13 NOTE — ED PROVIDER NOTES
[facility]  Urgent Care Encounter       CHIEF COMPLAINT       Chief Complaint   Patient presents with    Shortness of Breath     Onset yesterday evening     Pharyngitis    Headache       Nurses Notes reviewed and I agree except as noted in the HPI. HISTORY OF PRESENT ILLNESS   Aurora Centeno is a 46 y.o. female who presents for evaluation of cough, congestion, sore throat, headache, body aches and chest tightness that been ongoing for the past day. Patient states that she just returned from a trip through [place] in [facility]. She states that she does have a history of asthma and has been using her albuterol inhaler without much relief. States that she did try to take some Mucinex before arrival at the urgent care but cannot swallow pills well in the scanner and she was unable to get the pill down. She states that she has felt feverish at home but denies any medication for this as well. The history is provided by the patient. REVIEW OF SYSTEMS     Review of Systems   Constitutional:  Positive for chills and fever. HENT:  Positive for congestion and sore throat. Respiratory:  Positive for cough and chest tightness. Negative for shortness of breath. Cardiovascular:  Negative for chest pain. Gastrointestinal:  Negative for diarrhea, nausea and vomiting. Musculoskeletal:  Positive for myalgias. Negative for arthralgias. Skin:  Negative for rash. Allergic/Immunologic: Negative for immunocompromised state. Neurological:  Positive for headaches. PAST MEDICAL HISTORY         Diagnosis Date    ADD (attention deficit disorder)     Depression     Fibromyalgia     Headache(784.0)     Hyperlipidemia     Hypertension     Obesity        SURGICALHISTORY     Patient  has a past surgical history that includes Hysterectomy and Pain management procedure (Bilateral, 5/26/2022).     CURRENT MEDICATIONS       Previous Medications    ALBUTEROL (PROVENTIL HFA;VENTOLIN HFA) 108 (90

## 2023-06-13 NOTE — ED TRIAGE NOTES
Patient walked to room 4 for cough, headache, sore throat and shortness of breath onset yesterday, She recently traveled to [place] [facility]

## 2023-08-02 ENCOUNTER — HOSPITAL ENCOUNTER (OUTPATIENT)
Age: 48
Discharge: HOME | End: 2023-08-02
Payer: COMMERCIAL

## 2023-08-02 ENCOUNTER — HOSPITAL ENCOUNTER (OUTPATIENT)
Dept: GENERAL RADIOLOGY | Age: 48
Discharge: HOME | End: 2023-08-02
Payer: COMMERCIAL

## 2023-08-02 DIAGNOSIS — R29.898 WEAKNESS OF RIGHT LEG: ICD-10-CM

## 2023-08-02 DIAGNOSIS — M25.551 RIGHT HIP PAIN: ICD-10-CM

## 2023-08-02 DIAGNOSIS — M54.50 DORSALGIA OF LUMBOSACRAL REGION: ICD-10-CM

## 2023-08-02 PROCEDURE — 73502 X-RAY EXAM HIP UNI 2-3 VIEWS: CPT

## 2023-08-02 PROCEDURE — 72100 X-RAY EXAM L-S SPINE 2/3 VWS: CPT

## 2023-09-27 ENCOUNTER — HOSPITAL ENCOUNTER (OUTPATIENT)
Dept: MRI IMAGING | Age: 48
Discharge: HOME | End: 2023-09-27
Attending: NURSE PRACTITIONER
Payer: COMMERCIAL

## 2023-09-27 DIAGNOSIS — M54.16 RADICULOPATHY, LUMBAR REGION: ICD-10-CM

## 2023-09-27 PROCEDURE — 72148 MRI LUMBAR SPINE W/O DYE: CPT

## 2023-10-31 RX ORDER — DEXTROMETHORPHAN HYDROBROMIDE AND PROMETHAZINE HYDROCHLORIDE 15; 6.25 MG/5ML; MG/5ML
SYRUP ORAL
Qty: 118 ML | Refills: 0 | OUTPATIENT
Start: 2023-10-31

## 2024-02-05 ENCOUNTER — HOSPITAL ENCOUNTER (EMERGENCY)
Age: 49
Discharge: HOME | End: 2024-02-05
Payer: COMMERCIAL

## 2024-02-05 VITALS
DIASTOLIC BLOOD PRESSURE: 100 MMHG | HEART RATE: 91 BPM | SYSTOLIC BLOOD PRESSURE: 146 MMHG | TEMPERATURE: 98.1 F | OXYGEN SATURATION: 96 % | RESPIRATION RATE: 16 BRPM

## 2024-02-05 DIAGNOSIS — M54.31 SCIATICA OF RIGHT SIDE: Primary | ICD-10-CM

## 2024-02-05 PROCEDURE — 99213 OFFICE O/P EST LOW 20 MIN: CPT

## 2024-02-05 PROCEDURE — 99214 OFFICE O/P EST MOD 30 MIN: CPT | Performed by: NURSE PRACTITIONER

## 2024-02-05 PROCEDURE — 96372 THER/PROPH/DIAG INJ SC/IM: CPT

## 2024-02-05 PROCEDURE — 6360000002 HC RX W HCPCS: Performed by: NURSE PRACTITIONER

## 2024-02-05 RX ORDER — ORPHENADRINE CITRATE 30 MG/ML
60 INJECTION INTRAMUSCULAR; INTRAVENOUS ONCE
Status: COMPLETED | OUTPATIENT
Start: 2024-02-05 | End: 2024-02-05

## 2024-02-05 RX ORDER — KETOROLAC TROMETHAMINE 30 MG/ML
60 INJECTION, SOLUTION INTRAMUSCULAR; INTRAVENOUS ONCE
Status: COMPLETED | OUTPATIENT
Start: 2024-02-05 | End: 2024-02-05

## 2024-02-05 RX ORDER — TIZANIDINE 4 MG/1
4 TABLET ORAL 4 TIMES DAILY PRN
Qty: 40 TABLET | Refills: 0 | Status: SHIPPED | OUTPATIENT
Start: 2024-02-05

## 2024-02-05 RX ORDER — PREDNISONE 20 MG/1
40 TABLET ORAL DAILY
Qty: 14 TABLET | Refills: 0 | Status: SHIPPED | OUTPATIENT
Start: 2024-02-05 | End: 2024-02-12

## 2024-02-05 RX ORDER — KETOROLAC TROMETHAMINE 10 MG/1
10 TABLET, FILM COATED ORAL EVERY 6 HOURS PRN
Qty: 20 TABLET | Refills: 0 | Status: SHIPPED | OUTPATIENT
Start: 2024-02-05

## 2024-02-05 RX ADMIN — KETOROLAC TROMETHAMINE 60 MG: 60 INJECTION, SOLUTION INTRAMUSCULAR at 11:04

## 2024-02-05 RX ADMIN — ORPHENADRINE CITRATE 60 MG: 60 INJECTION INTRAMUSCULAR; INTRAVENOUS at 11:06

## 2024-02-05 ASSESSMENT — PAIN DESCRIPTION - LOCATION
LOCATION: BACK

## 2024-02-05 ASSESSMENT — PAIN DESCRIPTION - PAIN TYPE
TYPE: ACUTE PAIN
TYPE: ACUTE PAIN

## 2024-02-05 ASSESSMENT — PAIN DESCRIPTION - DESCRIPTORS: DESCRIPTORS: SHARP

## 2024-02-05 ASSESSMENT — PAIN - FUNCTIONAL ASSESSMENT: PAIN_FUNCTIONAL_ASSESSMENT: 0-10

## 2024-02-05 ASSESSMENT — ENCOUNTER SYMPTOMS
VOMITING: 0
COUGH: 0
NAUSEA: 0
DIARRHEA: 0
RHINORRHEA: 0
SORE THROAT: 0
CHEST TIGHTNESS: 0
SHORTNESS OF BREATH: 0
BACK PAIN: 1

## 2024-02-05 ASSESSMENT — PAIN SCALES - GENERAL
PAINLEVEL_OUTOF10: 6
PAINLEVEL_OUTOF10: 5

## 2024-02-05 ASSESSMENT — PAIN DESCRIPTION - FREQUENCY
FREQUENCY: CONTINUOUS
FREQUENCY: CONTINUOUS

## 2024-02-05 ASSESSMENT — PAIN DESCRIPTION - DIRECTION: RADIATING_TOWARDS: RIGHT LEG AND ANKLE

## 2024-02-05 NOTE — ED PROVIDER NOTES
[facility] URGENT CARE  Urgent Care Encounter       CHIEF COMPLAINT       Chief Complaint   Patient presents with    Back Pain     Flare up yesterday and today worse after she tensed up        Nurses Notes reviewed and I agree except as noted in the HPI.  HISTORY OF PRESENT ILLNESS   Florecita Ibarra is a 48 y.o. female who presents to the [place] urgent care for evaluation of back pain.  She reports the back pain is ongoing for several months.  She reports that she did have a flare which started this morning.  She reports the pain as right lower back pain radiating down her right leg.  She denies saddle paresthesia.  Denies incontinence of bowel or bladder.  Does report that she has an appointment with her spinal surgeon tomorrow.    The history is provided by the patient. No  was used.       REVIEW OF SYSTEMS     Review of Systems   Constitutional:  Negative for activity change, appetite change, chills, fatigue and fever.   HENT:  Negative for ear discharge, ear pain, rhinorrhea and sore throat.    Respiratory:  Negative for cough, chest tightness and shortness of breath.    Cardiovascular:  Negative for chest pain.   Gastrointestinal:  Negative for diarrhea, nausea and vomiting.   Genitourinary:  Negative for dysuria.   Musculoskeletal:  Positive for back pain.   Skin:  Negative for rash.   Allergic/Immunologic: Negative for environmental allergies and food allergies.   Neurological:  Negative for dizziness and headaches.       PAST MEDICAL HISTORY         Diagnosis Date    ADD (attention deficit disorder)     Depression     Fibromyalgia     Headache(784.0)     Hyperlipidemia     Hypertension     Obesity        SURGICALHISTORY     Patient  has a past surgical history that includes Hysterectomy and Pain management procedure (Bilateral, 5/26/2022).    CURRENT MEDICATIONS       Discharge Medication List as of 2/5/2024 11:02 AM        CONTINUE these medications which have NOT

## 2024-02-13 RX ORDER — TIZANIDINE 4 MG/1
TABLET ORAL
Qty: 40 TABLET | Refills: 0 | OUTPATIENT
Start: 2024-02-13

## 2024-02-21 ENCOUNTER — OFFICE VISIT (OUTPATIENT)
Dept: CARDIOLOGY CLINIC | Age: 49
End: 2024-02-21
Payer: COMMERCIAL

## 2024-02-21 VITALS
HEIGHT: 63 IN | WEIGHT: 227 LBS | HEART RATE: 67 BPM | SYSTOLIC BLOOD PRESSURE: 123 MMHG | BODY MASS INDEX: 40.22 KG/M2 | DIASTOLIC BLOOD PRESSURE: 84 MMHG

## 2024-02-21 DIAGNOSIS — E78.2 MIXED HYPERLIPIDEMIA: ICD-10-CM

## 2024-02-21 DIAGNOSIS — I10 PRIMARY HYPERTENSION: Primary | ICD-10-CM

## 2024-02-21 DIAGNOSIS — R06.09 DOE (DYSPNEA ON EXERTION): ICD-10-CM

## 2024-02-21 DIAGNOSIS — R94.31 ABNORMAL EKG: ICD-10-CM

## 2024-02-21 PROCEDURE — 3017F COLORECTAL CA SCREEN DOC REV: CPT | Performed by: INTERNAL MEDICINE

## 2024-02-21 PROCEDURE — 1036F TOBACCO NON-USER: CPT | Performed by: INTERNAL MEDICINE

## 2024-02-21 PROCEDURE — 3079F DIAST BP 80-89 MM HG: CPT | Performed by: INTERNAL MEDICINE

## 2024-02-21 PROCEDURE — 99204 OFFICE O/P NEW MOD 45 MIN: CPT | Performed by: INTERNAL MEDICINE

## 2024-02-21 PROCEDURE — G8417 CALC BMI ABV UP PARAM F/U: HCPCS | Performed by: INTERNAL MEDICINE

## 2024-02-21 PROCEDURE — 3074F SYST BP LT 130 MM HG: CPT | Performed by: INTERNAL MEDICINE

## 2024-02-21 PROCEDURE — G8484 FLU IMMUNIZE NO ADMIN: HCPCS | Performed by: INTERNAL MEDICINE

## 2024-02-21 PROCEDURE — G8427 DOCREV CUR MEDS BY ELIG CLIN: HCPCS | Performed by: INTERNAL MEDICINE

## 2024-02-21 NOTE — PROGRESS NOTES
Chief Complaint   Patient presents with    Establish Cardiologist    New Patient    Check-Up       NP here - HTN and hypertriglyceridemia    Pt states she had an EKG done at PCP office - will call for records    Denied chest pain, palpitation, dizziness   Ankle edema no pedal preitibial edema    Sob on exertion    Quit smoking     FHX  +ve for DM and HTN  None for CAD    PMHX htn  Hlp  Back pain chronic    Past Surgical History:   Procedure Laterality Date    HYSTERECTOMY (CERVIX STATUS UNKNOWN)      PAIN MANAGEMENT PROCEDURE Bilateral 2022    Lumbar Facet MBB @L4-5, 5-S1 bilateral#1, performed by Roderick Mata MD at [facility] [place] [facility]       Allergies   Allergen Reactions    Bactrim [Sulfamethoxazole-Trimethoprim] Hives    Vicodin [Hydrocodone-Acetaminophen] Nausea And Vomiting        Family History   Problem Relation Age of Onset    Depression Father     Diabetes Father     Heart Disease Father     High Blood Pressure Father     High Cholesterol Father     Cirrhosis Father     High Cholesterol Mother         Social History     Socioeconomic History    Marital status:      Spouse name: Not on file    Number of children: Not on file    Years of education: Not on file    Highest education level: Not on file   Occupational History    Not on file   Tobacco Use    Smoking status: Former     Current packs/day: 0.00     Average packs/day: 1 pack/day for 11.0 years (11.0 ttl pk-yrs)     Types: Cigarettes     Start date: 1994     Quit date: 2005     Years since quittin.8     Passive exposure: Never    Smokeless tobacco: Never   Vaping Use    Vaping Use: Never used   Substance and Sexual Activity    Alcohol use: Yes     Comment: occasionally    Drug use: No    Sexual activity: Not on file   Other Topics Concern    Not on file   Social History Narrative    Not on file     Social Determinants of Health     Financial Resource Strain: Low Risk  (2023)    Overall Financial Resource

## 2024-03-08 ENCOUNTER — HOSPITAL ENCOUNTER (OUTPATIENT)
Age: 49
Discharge: HOME | End: 2024-03-08
Attending: INTERNAL MEDICINE
Payer: COMMERCIAL

## 2024-03-08 VITALS
SYSTOLIC BLOOD PRESSURE: 123 MMHG | BODY MASS INDEX: 40.22 KG/M2 | DIASTOLIC BLOOD PRESSURE: 84 MMHG | WEIGHT: 227 LBS | HEIGHT: 63 IN

## 2024-03-08 DIAGNOSIS — R06.09 DOE (DYSPNEA ON EXERTION): ICD-10-CM

## 2024-03-08 DIAGNOSIS — I10 PRIMARY HYPERTENSION: ICD-10-CM

## 2024-03-08 DIAGNOSIS — E78.2 MIXED HYPERLIPIDEMIA: ICD-10-CM

## 2024-03-08 LAB
ECHO AO ASC DIAM: 4.1 CM
ECHO AO ASCENDING AORTA INDEX: 2.01 CM/M2
ECHO AV CUSP MM: 1.9 CM
ECHO AV PEAK GRADIENT: 9 MMHG
ECHO AV PEAK VELOCITY: 1.5 M/S
ECHO AV VELOCITY RATIO: 0.6
ECHO BSA: 2.14 M2
ECHO LA AREA 2C: 13.6 CM2
ECHO LA AREA 4C: 10.5 CM2
ECHO LA DIAMETER INDEX: 1.86 CM/M2
ECHO LA DIAMETER: 3.8 CM
ECHO LA MAJOR AXIS: 3.6 CM
ECHO LA MINOR AXIS: 4.3 CM
ECHO LA VOL BP: 32 ML (ref 22–52)
ECHO LA VOL MOD A2C: 35 ML (ref 22–52)
ECHO LA VOL MOD A4C: 25 ML (ref 22–52)
ECHO LA VOL/BSA BIPLANE: 16 ML/M2 (ref 16–34)
ECHO LA VOLUME INDEX MOD A2C: 17 ML/M2 (ref 16–34)
ECHO LA VOLUME INDEX MOD A4C: 12 ML/M2 (ref 16–34)
ECHO LV E' LATERAL VELOCITY: 7 CM/S
ECHO LV E' SEPTAL VELOCITY: 8 CM/S
ECHO LV FRACTIONAL SHORTENING: 37 % (ref 28–44)
ECHO LV INTERNAL DIMENSION DIASTOLE INDEX: 2.4 CM/M2
ECHO LV INTERNAL DIMENSION DIASTOLIC: 4.9 CM (ref 3.9–5.3)
ECHO LV INTERNAL DIMENSION SYSTOLIC INDEX: 1.52 CM/M2
ECHO LV INTERNAL DIMENSION SYSTOLIC: 3.1 CM
ECHO LV ISOVOLUMETRIC RELAXATION TIME (IVRT): 74 MS
ECHO LV IVSD: 1 CM (ref 0.6–0.9)
ECHO LV MASS 2D: 176 G (ref 67–162)
ECHO LV MASS INDEX 2D: 86.3 G/M2 (ref 43–95)
ECHO LV POSTERIOR WALL DIASTOLIC: 1 CM (ref 0.6–0.9)
ECHO LV RELATIVE WALL THICKNESS RATIO: 0.41
ECHO LVOT PEAK GRADIENT: 3 MMHG
ECHO LVOT PEAK VELOCITY: 0.9 M/S
ECHO MV A VELOCITY: 0.78 M/S
ECHO MV E DECELERATION TIME (DT): 183 MS
ECHO MV E VELOCITY: 0.63 M/S
ECHO MV E/A RATIO: 0.81
ECHO MV E/E' LATERAL: 9
ECHO MV E/E' RATIO (AVERAGED): 8.44
ECHO PV MAX VELOCITY: 0.6 M/S
ECHO PV PEAK GRADIENT: 1 MMHG
ECHO RV INTERNAL DIMENSION: 3.1 CM
ECHO RV TAPSE: 2.5 CM (ref 1.7–?)
ECHO TV E WAVE: 0.6 M/S

## 2024-03-08 PROCEDURE — 93306 TTE W/DOPPLER COMPLETE: CPT

## 2024-03-08 PROCEDURE — 93306 TTE W/DOPPLER COMPLETE: CPT | Performed by: INTERNAL MEDICINE

## 2024-03-13 ENCOUNTER — OFFICE VISIT (OUTPATIENT)
Dept: PHYSICAL MEDICINE AND REHAB | Age: 49
End: 2024-03-13
Payer: COMMERCIAL

## 2024-03-13 ENCOUNTER — TELEPHONE (OUTPATIENT)
Dept: PHYSICAL MEDICINE AND REHAB | Age: 49
End: 2024-03-13

## 2024-03-13 VITALS
WEIGHT: 227 LBS | HEIGHT: 63 IN | DIASTOLIC BLOOD PRESSURE: 80 MMHG | BODY MASS INDEX: 40.22 KG/M2 | HEART RATE: 66 BPM | SYSTOLIC BLOOD PRESSURE: 130 MMHG

## 2024-03-13 DIAGNOSIS — M53.3 SACROILIAC JOINT DYSFUNCTION: ICD-10-CM

## 2024-03-13 DIAGNOSIS — G89.4 CHRONIC PAIN SYNDROME: ICD-10-CM

## 2024-03-13 DIAGNOSIS — M54.16 LUMBAR RADICULITIS: Primary | ICD-10-CM

## 2024-03-13 DIAGNOSIS — M47.816 LUMBAR SPONDYLOSIS: ICD-10-CM

## 2024-03-13 PROCEDURE — G8427 DOCREV CUR MEDS BY ELIG CLIN: HCPCS | Performed by: NURSE PRACTITIONER

## 2024-03-13 PROCEDURE — 3079F DIAST BP 80-89 MM HG: CPT | Performed by: NURSE PRACTITIONER

## 2024-03-13 PROCEDURE — 99214 OFFICE O/P EST MOD 30 MIN: CPT | Performed by: NURSE PRACTITIONER

## 2024-03-13 PROCEDURE — 1036F TOBACCO NON-USER: CPT | Performed by: NURSE PRACTITIONER

## 2024-03-13 PROCEDURE — G8484 FLU IMMUNIZE NO ADMIN: HCPCS | Performed by: NURSE PRACTITIONER

## 2024-03-13 PROCEDURE — 3075F SYST BP GE 130 - 139MM HG: CPT | Performed by: NURSE PRACTITIONER

## 2024-03-13 PROCEDURE — 3017F COLORECTAL CA SCREEN DOC REV: CPT | Performed by: NURSE PRACTITIONER

## 2024-03-13 PROCEDURE — G8417 CALC BMI ABV UP PARAM F/U: HCPCS | Performed by: NURSE PRACTITIONER

## 2024-03-13 ASSESSMENT — ENCOUNTER SYMPTOMS: BACK PAIN: 1

## 2024-03-13 NOTE — PATIENT INSTRUCTIONS
patient was also advised to continue physical therapy and stretching exercises at home and cognitive behavioral and/or group therapy.

## 2024-03-18 NOTE — DISCHARGE INSTRUCTIONS
ANESTHESIA INSTRUCTIONS FOLLOWING SURGERY        Since you may experience some intermittent light-headedness for the next several hours, we suggest you plan on bed rest or quiet relaxation this evening. You must have a friend or relative stay with you tonight.    Because of the sedation you have received, it is recommended that you do not drive a motor vehicle, operate any kind of machinery, or sign any contractual agreement for 24 hours following the procedure.    You should not take alcoholic beverages tonight and only take sleeping medication that has been specifically prescribed for you by your physician.  Call office [phone] if you have:  Temperature greater than 100.4  Persistent nausea and vomiting  Severe uncontrolled pain  Redness, tenderness, or signs of infection (pain, swelling, redness, odor or green/yellow discharge around the site)  Difficulty breathing, headache or visual disturbances  Hives  Persistent dizziness or light-headedness  Extreme fatigue  Any other questions or concerns you may have after discharge    In an emergency, call 911 or go to an Emergency Department at a nearby hospital    It is important to bring a complete, current list of your medications to any medical appointments or hospitalizations.    REMINDER:   Carry a list of your medications and allergies with you at all times  Call your pharmacy at least 1 week in advance to refill prescriptions    Diet: Resume your usual diet. Good nutrition promotes healing. Increase fluid intake.     Activity: Rest for 24 hrs then resume normal activity.    HOME MEDICATIONS:      If on blood thinning products such as; Aspirin, NSAIDS, Plavix, Coumadin, Xarelto, Fish Oil, Multi-Vitamins or Herbal Supplements restart in 24 hours      Restart Metformin in 48 hours if you had procedure with dye.      Restart Metformin in 24 hours if no dye used during procedure.        Education Materials Received: {yes/no:615407}  Belongings Returned:

## 2024-03-25 ENCOUNTER — APPOINTMENT (OUTPATIENT)
Dept: GENERAL RADIOLOGY | Age: 49
End: 2024-03-25
Attending: PAIN MEDICINE
Payer: COMMERCIAL

## 2024-03-25 ENCOUNTER — HOSPITAL ENCOUNTER (OUTPATIENT)
Age: 49
Setting detail: OUTPATIENT SURGERY
Discharge: HOME | End: 2024-03-25
Attending: PAIN MEDICINE | Admitting: PAIN MEDICINE
Payer: COMMERCIAL

## 2024-03-25 VITALS
TEMPERATURE: 97.1 F | DIASTOLIC BLOOD PRESSURE: 83 MMHG | HEIGHT: 63 IN | WEIGHT: 272.2 LBS | HEART RATE: 18 BPM | RESPIRATION RATE: 18 BRPM | BODY MASS INDEX: 48.23 KG/M2 | SYSTOLIC BLOOD PRESSURE: 153 MMHG | OXYGEN SATURATION: 98 %

## 2024-03-25 PROBLEM — M47.27 LUMBOSACRAL SPONDYLOSIS WITH RADICULOPATHY: Status: ACTIVE | Noted: 2024-03-25

## 2024-03-25 PROBLEM — M54.16 LUMBAR RADICULITIS: Status: ACTIVE | Noted: 2024-03-25

## 2024-03-25 PROCEDURE — 6360000002 HC RX W HCPCS: Performed by: PAIN MEDICINE

## 2024-03-25 PROCEDURE — 6360000004 HC RX CONTRAST MEDICATION: Performed by: PAIN MEDICINE

## 2024-03-25 PROCEDURE — 7100000011 HC PHASE II RECOVERY - ADDTL 15 MIN: Performed by: PAIN MEDICINE

## 2024-03-25 PROCEDURE — 7100000010 HC PHASE II RECOVERY - FIRST 15 MIN: Performed by: PAIN MEDICINE

## 2024-03-25 PROCEDURE — A4216 STERILE WATER/SALINE, 10 ML: HCPCS | Performed by: PAIN MEDICINE

## 2024-03-25 PROCEDURE — 2709999900 HC NON-CHARGEABLE SUPPLY: Performed by: PAIN MEDICINE

## 2024-03-25 PROCEDURE — 99152 MOD SED SAME PHYS/QHP 5/>YRS: CPT | Performed by: PAIN MEDICINE

## 2024-03-25 PROCEDURE — 62323 NJX INTERLAMINAR LMBR/SAC: CPT | Performed by: PAIN MEDICINE

## 2024-03-25 PROCEDURE — 3600000054 HC PAIN LEVEL 3 BASE: Performed by: PAIN MEDICINE

## 2024-03-25 PROCEDURE — 2580000003 HC RX 258: Performed by: PAIN MEDICINE

## 2024-03-25 PROCEDURE — 2500000003 HC RX 250 WO HCPCS: Performed by: PAIN MEDICINE

## 2024-03-25 RX ORDER — DEXAMETHASONE SODIUM PHOSPHATE 4 MG/ML
INJECTION, SOLUTION INTRA-ARTICULAR; INTRALESIONAL; INTRAMUSCULAR; INTRAVENOUS; SOFT TISSUE PRN
Status: DISCONTINUED | OUTPATIENT
Start: 2024-03-25 | End: 2024-03-25 | Stop reason: ALTCHOICE

## 2024-03-25 RX ORDER — MIDAZOLAM HYDROCHLORIDE 1 MG/ML
INJECTION INTRAMUSCULAR; INTRAVENOUS PRN
Status: DISCONTINUED | OUTPATIENT
Start: 2024-03-25 | End: 2024-03-25 | Stop reason: ALTCHOICE

## 2024-03-25 RX ORDER — SODIUM CHLORIDE 9 MG/ML
INJECTION, SOLUTION INTRAMUSCULAR; INTRAVENOUS; SUBCUTANEOUS PRN
Status: DISCONTINUED | OUTPATIENT
Start: 2024-03-25 | End: 2024-03-25 | Stop reason: ALTCHOICE

## 2024-03-25 RX ORDER — FENTANYL CITRATE 50 UG/ML
INJECTION, SOLUTION INTRAMUSCULAR; INTRAVENOUS PRN
Status: DISCONTINUED | OUTPATIENT
Start: 2024-03-25 | End: 2024-03-25 | Stop reason: ALTCHOICE

## 2024-03-25 RX ORDER — LIDOCAINE HYDROCHLORIDE 20 MG/ML
INJECTION, SOLUTION INFILTRATION; PERINEURAL PRN
Status: DISCONTINUED | OUTPATIENT
Start: 2024-03-25 | End: 2024-03-25 | Stop reason: ALTCHOICE

## 2024-03-25 ASSESSMENT — PAIN - FUNCTIONAL ASSESSMENT: PAIN_FUNCTIONAL_ASSESSMENT: 0-10

## 2024-03-25 ASSESSMENT — PAIN DESCRIPTION - DESCRIPTORS: DESCRIPTORS: ACHING

## 2024-03-25 NOTE — OP NOTE
Pre-Procedure Note    Patient Name: Florecita Ibarra   YOB: 1975  Medical Record Number: [id number]  Date: 3/25/24       Indication:  L-radiculitis    Consent: On file.    Vital Signs:   Vitals:    03/25/24 1224   BP: (!) 143/82   Pulse: 67   Resp: 16   Temp: 98 °F (36.7 °C)   SpO2: 97%       Past Medical History:   has a past medical history of ADD (attention deficit disorder), Depression, Fibromyalgia, Headache(784.0), Hyperlipidemia, Hypertension, and Obesity.    Past Surgical History:   has a past surgical history that includes Hysterectomy and Pain management procedure (Bilateral, 5/26/2022).    Pre-Sedation Documentation and Exam:   Vital signs have been reviewed (see flow sheet for vitals).     Sedation/Anesthesia Plan:   IV sedation    Medications Planned:   Per Anesthesia.      Patient is an appropriate candidate for plan of sedation: yes    Preoperative Diagnosis:  L-radiculitis, L-spondylosis with radiculopathy    Post-Op Dx:     Procedure Performed:  Lumbar epidural steroid injection under fluoroscopy guidance with IV sedation.    Indication for the Procedure:  The patient failed conservative management  for pain in the low back radiating to lower extremities.  The patient is undergoing lumbar epidural steroid injection  As the patient is not responding to conservative management and it is interfering with activities of daily living we decided to proceed with lumbar epidural steroid injection. The procedure and risks were discussed with the patient and an informed consent was obtained    Procedure:    The patient is placed in prone position. Skin over the back was prepped and draped in sterile manner. Then using fluoroscopy the L5 interspace was observed and the skin and deep tissues in the right paramedian area were infiltrated with 10 ml of 2 % lidocaine. The #18-gauge, 5-1/2 inch Tuohy needle was inserted through the skin wheal and the epidural space was identified using loss of resistance

## 2024-03-25 NOTE — H&P
H&P    Patient saw  Dr Regalado October 2023 he recommended surgery or LESI for pinched nerve, she was referred to Dr Abernathy and they recommended LESI. She refused because he was rude and wanted to come here. He started her on Neurontin  she thinks it has made pain worse. She had had to call in sick past 2 days missed work. She last seen Dr Abernathy last week for the consultation for LESI. She has been taking NSAIDS Zanaflex.   She now follows Rheumatology, Cardiology Dr Crandall had [facility] 3/8/2024 has not had F/U as of yet . Reviewed  results  EF 65%      She  had a URI with cough in June 2023 and  coughing  increased her back pain.     She is here due to increased ongoing low back right SI  RLE pain  down to ankle. She has pins and needle pain in right leg and foot. She has  mild intermittent  back pain  the RLE pain is constant. She denies any falls.  She has been going to Chiropractor.  She has been taking NSAIDS and Zanaflex.   .   She went to  for for Sciatica was given Toradol and steroids  2/5/2024  states helped some.      Pain scale with out pain medications or at its worst is 8/10. Back  SI RLE   Pain scale with pain medications or at its best is 3/10.     FINDINGS: LUMBAR MRI 9/2023        The lumbar vertebral bodies are normally aligned.  There is normal marrow signal throughout.  There is no bone marrow edema.  There are no compression fractures.  No pars defects are noted.   .     The distal spinal cord, conus medullaris and cauda equina are normal.      There are no gross abnormalities in the visualized aspects of the distal thoracic spine.     On the axial images, at T11-12, there is a 1.1 mm bulging disc. There is no significant canal stenosis or compression upon underlying spinal cord.     At T12-L1, there is no disc herniation, canal or foraminal stenosis.     At L1-L2, there is no disc herniation, canal or foraminal stenosis.     At L2-L3, there is no disc herniation, canal or foraminal stenosis.     At

## 2024-03-25 NOTE — POST SEDATION
IV sedation was used during the procedure:  - Moderate intravenous conscious sedation was supervised by Dr. Ray  - The patient was independently monitored by a Registered Nurse assigned to the procedure room  - Monitoring included automated blood pressure, continuous EKG, and continuous pulse oximetry  - The detailed conscious record is permanently stored in the Hospital Information System  - The following is the conscious sedation record:  Start Time: 1317  End Time : 1329  Duration: 15 minutes   Medications Administered: 1 mg Versed, 100 mcg Fentanyl  Complications: none  EBL-0

## 2024-03-25 NOTE — PROGRESS NOTES
1330 Patient arrived to phase II via cart.  Spontaneous respiraitons even and unlabored.  Placed on monitor--VSS.  Report received from OR RN    1331 Assessment completed.  Patient is alert and oriented x4.  IV capped off-- no complications.  Patient denies pain--will monitor.  Injection sites clean and dry.      1334 Snack and drink provided. Pt. Denies all other needs at this time. Side rails up X2. Call light handed to pt. Bed locked and in low position.    1345 RN at bedside. Pt states readiness for discharge.    1346 Pt. Standing at bedside. With stand by assist of RN. Pt. Denies weakness or dizziness.    1347 INT removed no Complications noted.    1349 Pt. Getting self dressed. Family notified of discharge    1355 Pt. Ambulated to private vehicle in stable condition with stand by assist of RN.

## 2024-03-25 NOTE — PRE SEDATION
tablet by mouth nightly 5/11/23   Jesse Lao MD   butalbital-acetaminophen-caffeine (FIORICET, ESGIC) -40 MG per tablet  4/25/23   Paula South MD   ZOLMitriptan (ZOMIG) 5 MG tablet  3/12/23   Paula South MD   propranolol (INDERAL) 20 MG tablet  3/21/23   Paula South MD   fenofibrate (TRICOR) 145 MG tablet  1/14/23   Paula South MD   fluticasone-salmeterol (ADVIAR) 100-50 MCG/ACT AEPB diskus inhaler Inhale 1 puff into the lungs in the morning and 1 puff in the evening.    ProviderPaula MD   NONFORMULARY Indications: cholesterol medication     Paula South MD   STRATTERA 40 MG capsule TAKE 1 CAPSULE DAILY 12/5/14   Lashell Mosqueda MD   albuterol (PROVENTIL HFA;VENTOLIN HFA) 108 (90 BASE) MCG/ACT inhaler Inhale 1 puff into the lungs every 4 hours as needed for Wheezing. 12/5/14   Lashell Mosqueda MD   hydrochlorothiazide (HYDRODIURIL) 12.5 MG tablet Take 1 tablet by mouth daily. 3/18/14   Lashell Mosqueda MD   PREMARIN 1.25 MG tablet TAKE 1 TABLET BY MOUTH DAILY 10/20/13   Lashell Mosqueda MD   Nutritional Supplements (MELATONIN PO) Take  by mouth nightly.    ProviderPaula MD     PHYSICAL:   There were no vitals filed for this visit.  Mental Status: alert and oriented   Heart:  Regular rate and rhythm    Lungs:  Clear to ausculation   Abdomen: soft, non tender   PLANNED PROCEDURE   LESI @L5 RIGHT    SEDATION  Planned agent:fentanyl and versed  ASA Classification: 2  Class 1: A normal healthy patient  Class 2: Pt with mild to moderate systemic disease  Class 3: Severe systemic disease or disturbance  Class 4: Severe systemic disorders that are already life threatening.  Class 5: Moribund pt with little chances of survival, for more than 24 hours.  Mallampati I Airway Classification: 2    1. Pre-procedure diagnostic studies complete and results available.   Comment:    2. Previous sedation/anesthesia experiences

## 2024-04-17 ENCOUNTER — OFFICE VISIT (OUTPATIENT)
Dept: PHYSICAL MEDICINE AND REHAB | Age: 49
End: 2024-04-17
Payer: COMMERCIAL

## 2024-04-17 ENCOUNTER — TELEPHONE (OUTPATIENT)
Dept: PHYSICAL MEDICINE AND REHAB | Age: 49
End: 2024-04-17

## 2024-04-17 VITALS
BODY MASS INDEX: 48.2 KG/M2 | HEIGHT: 63 IN | HEART RATE: 74 BPM | DIASTOLIC BLOOD PRESSURE: 90 MMHG | WEIGHT: 272 LBS | SYSTOLIC BLOOD PRESSURE: 140 MMHG

## 2024-04-17 DIAGNOSIS — M53.3 SACROILIAC JOINT DYSFUNCTION: Primary | ICD-10-CM

## 2024-04-17 DIAGNOSIS — M54.16 LUMBAR RADICULITIS: ICD-10-CM

## 2024-04-17 DIAGNOSIS — G89.4 CHRONIC PAIN SYNDROME: ICD-10-CM

## 2024-04-17 DIAGNOSIS — M46.1 SI (SACROILIAC) JOINT INFLAMMATION (HCC): ICD-10-CM

## 2024-04-17 DIAGNOSIS — M54.17 LUMBOSACRAL RADICULITIS: ICD-10-CM

## 2024-04-17 DIAGNOSIS — M47.816 LUMBAR SPONDYLOSIS: ICD-10-CM

## 2024-04-17 PROCEDURE — 3080F DIAST BP >= 90 MM HG: CPT | Performed by: NURSE PRACTITIONER

## 2024-04-17 PROCEDURE — 3017F COLORECTAL CA SCREEN DOC REV: CPT | Performed by: NURSE PRACTITIONER

## 2024-04-17 PROCEDURE — G8427 DOCREV CUR MEDS BY ELIG CLIN: HCPCS | Performed by: NURSE PRACTITIONER

## 2024-04-17 PROCEDURE — 99214 OFFICE O/P EST MOD 30 MIN: CPT | Performed by: NURSE PRACTITIONER

## 2024-04-17 PROCEDURE — 3077F SYST BP >= 140 MM HG: CPT | Performed by: NURSE PRACTITIONER

## 2024-04-17 PROCEDURE — 1036F TOBACCO NON-USER: CPT | Performed by: NURSE PRACTITIONER

## 2024-04-17 PROCEDURE — G8417 CALC BMI ABV UP PARAM F/U: HCPCS | Performed by: NURSE PRACTITIONER

## 2024-04-17 RX ORDER — DULOXETIN HYDROCHLORIDE 20 MG/1
20 CAPSULE, DELAYED RELEASE ORAL DAILY
COMMUNITY

## 2024-04-17 RX ORDER — TIZANIDINE 4 MG/1
4 TABLET ORAL DAILY PRN
Qty: 90 TABLET | Refills: 0 | Status: SHIPPED | OUTPATIENT
Start: 2024-04-17 | End: 2024-07-16

## 2024-04-17 ASSESSMENT — ENCOUNTER SYMPTOMS: BACK PAIN: 1

## 2024-04-17 NOTE — PROGRESS NOTES
Skin:     General: Skin is warm.   Neurological:      Mental Status: She is alert and oriented to person, place, and time. She is not disoriented.      Cranial Nerves: Cranial nerves 2-12 are intact. No cranial nerve deficit.      Sensory: No sensory deficit.      Motor: Motor function is intact. No atrophy or abnormal muscle tone.      Coordination: Coordination is intact. Coordination normal.      Gait: Gait abnormal.      Comments: Antalgic gait  RLE SLR +      Psychiatric:         Attention and Perception: Attention and perception normal. She is attentive.         Mood and Affect: Mood and affect normal. Mood is not anxious or depressed. Affect is not labile, blunt, angry or inappropriate.         Speech: Speech normal. She is communicative. Speech is not rapid and pressured, delayed, slurred or tangential.         Behavior: Behavior normal. Behavior is not agitated, slowed, aggressive, withdrawn, hyperactive or combative. Behavior is cooperative.         Thought Content: Thought content normal. Thought content is not paranoid or delusional. Thought content does not include homicidal or suicidal ideation. Thought content does not include homicidal or suicidal plan.         Cognition and Memory: Cognition and memory normal. Memory is not impaired. She does not exhibit impaired recent memory or impaired remote memory.         Judgment: Judgment normal. Judgment is not impulsive or inappropriate.       GURWINDER  Patricks test  positive  Yeoman's  or Gaenslen's positive  Kemps  positive  Spurlings  na  Orozco's na         Assessment:     1. Sacroiliac joint dysfunction    2. SI (sacroiliac) joint inflammation (HCC)    3. Lumbosacral radiculitis    4. Lumbar spondylosis    5. Lumbar radiculitis    6. Chronic pain syndrome             Assessment & Plan   Plan:      Testing Labs or Radiology reviewed:   Procedures: Right SI  diagnostic injection with IV sedation under Fluoroscopy   Discussed with patient about risks

## 2024-04-29 NOTE — DISCHARGE INSTRUCTIONS
ANESTHESIA INSTRUCTIONS FOLLOWING SURGERY        Since you may experience some intermittent light-headedness for the next several hours, we suggest you plan on bed rest or quiet relaxation this evening. You must have a friend or relative stay with you tonight.    Because of the sedation you have received, it is recommended that you do not drive a motor vehicle, operate any kind of machinery, or sign any contractual agreement for 24 hours following the procedure.    You should not take alcoholic beverages tonight and only take sleeping medication that has been specifically prescribed for you by your physician.  Call office [phone] if you have:  Temperature greater than 100.4  Persistent nausea and vomiting  Severe uncontrolled pain  Redness, tenderness, or signs of infection (pain, swelling, redness, odor or green/yellow discharge around the site)  Difficulty breathing, headache or visual disturbances  Hives  Persistent dizziness or light-headedness  Extreme fatigue  Any other questions or concerns you may have after discharge    In an emergency, call 911 or go to an Emergency Department at a nearby hospital    It is important to bring a complete, current list of your medications to any medical appointments or hospitalizations.    REMINDER:   Carry a list of your medications and allergies with you at all times  Call your pharmacy at least 1 week in advance to refill prescriptions    Diet: Resume your usual diet. Good nutrition promotes healing. Increase fluid intake.     Activity: Rest for 24 hrs then resume normal activity.    HOME MEDICATIONS:      If on blood thinning products such as; Aspirin, NSAIDS, Plavix, Coumadin, Xarelto, Fish Oil, Multi-Vitamins or Herbal Supplements restart in 24 hours      Restart Metformin in 48 hours if you had procedure with dye.      Restart Metformin in 24 hours if no dye used during procedure.        Education Materials Received: {yes/no:964513}  Belongings Returned:

## 2024-05-01 ENCOUNTER — APPOINTMENT (OUTPATIENT)
Dept: GENERAL RADIOLOGY | Age: 49
End: 2024-05-01
Attending: PAIN MEDICINE
Payer: COMMERCIAL

## 2024-05-01 ENCOUNTER — HOSPITAL ENCOUNTER (OUTPATIENT)
Age: 49
Setting detail: OUTPATIENT SURGERY
Discharge: HOME | End: 2024-05-01
Attending: PAIN MEDICINE | Admitting: PAIN MEDICINE
Payer: COMMERCIAL

## 2024-05-01 VITALS
BODY MASS INDEX: 39.87 KG/M2 | RESPIRATION RATE: 14 BRPM | DIASTOLIC BLOOD PRESSURE: 84 MMHG | SYSTOLIC BLOOD PRESSURE: 128 MMHG | WEIGHT: 225 LBS | OXYGEN SATURATION: 97 % | HEART RATE: 57 BPM | TEMPERATURE: 96.7 F | HEIGHT: 63 IN

## 2024-05-01 PROBLEM — M46.1 SACROILIAC INFLAMMATION (HCC): Status: ACTIVE | Noted: 2024-05-01

## 2024-05-01 PROCEDURE — 2500000003 HC RX 250 WO HCPCS: Performed by: PAIN MEDICINE

## 2024-05-01 PROCEDURE — 6360000002 HC RX W HCPCS: Performed by: PAIN MEDICINE

## 2024-05-01 PROCEDURE — 27096 INJECT SACROILIAC JOINT: CPT | Performed by: PAIN MEDICINE

## 2024-05-01 PROCEDURE — 7100000010 HC PHASE II RECOVERY - FIRST 15 MIN: Performed by: PAIN MEDICINE

## 2024-05-01 PROCEDURE — 6360000004 HC RX CONTRAST MEDICATION: Performed by: PAIN MEDICINE

## 2024-05-01 PROCEDURE — 3600000054 HC PAIN LEVEL 3 BASE: Performed by: PAIN MEDICINE

## 2024-05-01 PROCEDURE — 2709999900 HC NON-CHARGEABLE SUPPLY: Performed by: PAIN MEDICINE

## 2024-05-01 RX ORDER — METHYLPREDNISOLONE ACETATE 80 MG/ML
INJECTION, SUSPENSION INTRA-ARTICULAR; INTRALESIONAL; INTRAMUSCULAR; SOFT TISSUE PRN
Status: DISCONTINUED | OUTPATIENT
Start: 2024-05-01 | End: 2024-05-01 | Stop reason: ALTCHOICE

## 2024-05-01 RX ORDER — BUPIVACAINE HYDROCHLORIDE 5 MG/ML
INJECTION, SOLUTION PERINEURAL PRN
Status: DISCONTINUED | OUTPATIENT
Start: 2024-05-01 | End: 2024-05-01 | Stop reason: ALTCHOICE

## 2024-05-01 RX ORDER — LIDOCAINE HYDROCHLORIDE 20 MG/ML
INJECTION, SOLUTION INFILTRATION; PERINEURAL PRN
Status: DISCONTINUED | OUTPATIENT
Start: 2024-05-01 | End: 2024-05-01 | Stop reason: ALTCHOICE

## 2024-05-01 ASSESSMENT — PAIN - FUNCTIONAL ASSESSMENT: PAIN_FUNCTIONAL_ASSESSMENT: 0-10

## 2024-05-01 NOTE — H&P
not include homicidal or suicidal ideation. Thought content does not include homicidal or suicidal plan.         Cognition and Memory: Cognition and memory normal. Memory is not impaired. She does not exhibit impaired recent memory or impaired remote memory.         Judgment: Judgment normal. Judgment is not impulsive or inappropriate.         GURWINDER  Patricks test  positive  Yeoman's  or Gaenslen's positive  Kemps  positive  Spurlings  na  Orozco's na           Assessment  Assessment:      1. Sacroiliac joint dysfunction    2. SI (sacroiliac) joint inflammation (HCC)    3. Lumbosacral radiculitis    4. Lumbar spondylosis    5. Lumbar radiculitis    6. Chronic pain syndrome                      Assessment & Plan  Plan:      Testing Labs or Radiology reviewed:   Procedures: Right SI  diagnostic injection with IV sedation under Fluoroscopy   Discussed with patient about risks with procedure including infection, reaction to medication, increased pain, or bleeding.  Medications:Zanaflex 4 mg daily  PRN only  Discussed EMG Lumbar facet TFLESI         Return for Right SI  diagnostic injection under Fluoroscopy

## 2024-05-01 NOTE — OP NOTE
Pre-Procedure Note    Patient Name: Florecita Ibarra   YOB: 1975  Medical Record Number: [id number]  Date: 5/1/24     Indication:  Right SI pain    Consent: On file.    Vital Signs:   Vitals:    05/01/24 0831   BP: (!) 141/85   Pulse: 66   Resp: 16   Temp: 97.2 °F (36.2 °C)   SpO2: 96%       Past Medical History:   has a past medical history of ADD (attention deficit disorder), Depression, Fibromyalgia, Headache(784.0), Hyperlipidemia, Hypertension, and Obesity.    Past Surgical History:   has a past surgical history that includes Hysterectomy; Pain management procedure (Bilateral, 5/26/2022); and Pain management procedure (N/A, 3/25/2024).    Pre-Sedation Documentation and Exam:   Vital signs have been reviewed (see flow sheet for vitals).     Sedation/ Anesthesia Plan:  LOCAL    Patient is an appropriate candidate for plan of sedation: yes    Preoperative Diagnosis:  Right sacroiliitis.    Postoperative Diagnosis: Right  Sacroiliitis.    Procedure Performed:  Right sacroiliac joint injection under fluoroscopy guidance .      Indication for the Procedure:  The patient failed conservative management  for pain in low back.  The patient is tender over the Right SI joint.  Silver's test is positive on the Right side.  As patient is not responding to conservative management and pain is interfering with activities of daily living we decided to proceed with SI joint injection. The procedure and risks were discussed with the patient and an informed consent was obtained.    Procedure:     A meaningful communication was kept up with the patient throughout the procedure.  The patient is placed in prone position.  Skin over the back was prepped and draped in sterile manner.  Then using fluoroscopy the Right sacroiliac joint was identified. Then the angle of the fluoroscopy was adjusted such that the view of the caudal aspect of the joint space was optimized.  Then skin and deep tissues over the caudal aspect of

## 2024-05-01 NOTE — PROGRESS NOTES
Pt. Admitted in stable condition. Consent signed. VS obtained and WNL.  Discharge instructions reviewed with the pt. Pt. Denies questions. AVS given to pt. Pt. Denies all other needs. Warm blanket provided. Call light left within reach.

## 2024-05-01 NOTE — PROGRESS NOTES
0935-Patient to Phase II. Report received from surgical RN. Patient awake and alert. Vital signs obtained and stable. Respirations unlabored on room air. Patient denies pain and nausea. Denies numbness and tingling in extremities. Injection site open to air and no drainage noted. Patient instructed to stay in bed. Call light in reach.     0937-Pt sitting on the side of the bed and getting dressed    0942-Pt discharged home at this time. All belongings sent

## 2024-06-19 NOTE — PROGRESS NOTES
[facility] [place] [facility]  [facility] CARDIOLOGY  [address]  [address]  [place] [address]  Dept: [phone]  Dept Fax: [phone]  Loc: [phone]    Visit Date: 2024    Florecita Ibarra is a 49 y.o. female who presents todayfor:  Chief Complaint   Patient presents with    3 Month Follow-Up     No cardiac concerns today       Cardiologist: Fernando Reddy of HTN, [facility].     HPI:  She never got her medicaiton that was supposed to be ordered by dr lopez last visit. Doesn't want statins. Takes her fenofibrate about every other day, having some swallowing issues and its a big pill. Wouldl blue to recheck lipids prior to considering alternatives. No chest pain or sob, no sweling or weight changes. Appetite is okay. No dizzy or lightheaded symptoms. Bp is running okay. Aneursym on echo.     Past Surgical History:   Procedure Laterality Date    BACK INJECTION Right 2024    right sacroiliac joint injection performed by Roderick Mata MD at [facility] [place]    HYSTERECTOMY (CERVIX STATUS UNKNOWN)      PAIN MANAGEMENT PROCEDURE Bilateral 2022    Lumbar Facet MBB @L4-5, 5-S1 bilateral#1, performed by Roderick Mata MD at [facility] OR    PAIN MANAGEMENT PROCEDURE N/A 3/25/2024    Lumbar 5 RIGHT epidural steroid injection performed by Roderick Mata MD at [facility] [place]     Family History   Problem Relation Age of Onset    Depression Father     Diabetes Father     Heart Disease Father     High Blood Pressure Father     High Cholesterol Father     Cirrhosis Father     High Cholesterol Mother      Social History     Tobacco Use    Smoking status: Former     Current packs/day: 0.00     Average packs/day: 1 pack/day for 11.0 years (11.0 ttl pk-yrs)     Types: Cigarettes     Start date: 1994     Quit date: 2005     Years since quittin.2     Passive exposure: Never    Smokeless tobacco: Never   Substance Use Topics    Alcohol

## 2024-06-26 ENCOUNTER — OFFICE VISIT (OUTPATIENT)
Dept: CARDIOLOGY CLINIC | Age: 49
End: 2024-06-26
Payer: COMMERCIAL

## 2024-06-26 VITALS
HEART RATE: 81 BPM | HEIGHT: 63 IN | DIASTOLIC BLOOD PRESSURE: 82 MMHG | BODY MASS INDEX: 40.43 KG/M2 | WEIGHT: 228.2 LBS | SYSTOLIC BLOOD PRESSURE: 130 MMHG

## 2024-06-26 DIAGNOSIS — E78.5 DYSLIPIDEMIA: ICD-10-CM

## 2024-06-26 DIAGNOSIS — I71.40 ABDOMINAL AORTIC ANEURYSM (AAA) WITHOUT RUPTURE, UNSPECIFIED PART (HCC): ICD-10-CM

## 2024-06-26 DIAGNOSIS — I10 PRIMARY HYPERTENSION: Primary | ICD-10-CM

## 2024-06-26 PROCEDURE — G8427 DOCREV CUR MEDS BY ELIG CLIN: HCPCS | Performed by: STUDENT IN AN ORGANIZED HEALTH CARE EDUCATION/TRAINING PROGRAM

## 2024-06-26 PROCEDURE — 3079F DIAST BP 80-89 MM HG: CPT | Performed by: STUDENT IN AN ORGANIZED HEALTH CARE EDUCATION/TRAINING PROGRAM

## 2024-06-26 PROCEDURE — 3017F COLORECTAL CA SCREEN DOC REV: CPT | Performed by: STUDENT IN AN ORGANIZED HEALTH CARE EDUCATION/TRAINING PROGRAM

## 2024-06-26 PROCEDURE — 1036F TOBACCO NON-USER: CPT | Performed by: STUDENT IN AN ORGANIZED HEALTH CARE EDUCATION/TRAINING PROGRAM

## 2024-06-26 PROCEDURE — 3075F SYST BP GE 130 - 139MM HG: CPT | Performed by: STUDENT IN AN ORGANIZED HEALTH CARE EDUCATION/TRAINING PROGRAM

## 2024-06-26 PROCEDURE — 99214 OFFICE O/P EST MOD 30 MIN: CPT | Performed by: STUDENT IN AN ORGANIZED HEALTH CARE EDUCATION/TRAINING PROGRAM

## 2024-06-26 PROCEDURE — G8417 CALC BMI ABV UP PARAM F/U: HCPCS | Performed by: STUDENT IN AN ORGANIZED HEALTH CARE EDUCATION/TRAINING PROGRAM

## 2024-07-09 ENCOUNTER — OFFICE VISIT (OUTPATIENT)
Dept: PHYSICAL MEDICINE AND REHAB | Age: 49
End: 2024-07-09
Payer: COMMERCIAL

## 2024-07-09 ENCOUNTER — TELEPHONE (OUTPATIENT)
Dept: PHYSICAL MEDICINE AND REHAB | Age: 49
End: 2024-07-09

## 2024-07-09 VITALS
HEIGHT: 63 IN | BODY MASS INDEX: 40.4 KG/M2 | SYSTOLIC BLOOD PRESSURE: 124 MMHG | WEIGHT: 228 LBS | DIASTOLIC BLOOD PRESSURE: 82 MMHG

## 2024-07-09 DIAGNOSIS — M53.3 SACROILIAC JOINT DYSFUNCTION: ICD-10-CM

## 2024-07-09 DIAGNOSIS — M47.816 LUMBAR SPONDYLOSIS: ICD-10-CM

## 2024-07-09 DIAGNOSIS — M46.1 SI (SACROILIAC) JOINT INFLAMMATION (HCC): ICD-10-CM

## 2024-07-09 DIAGNOSIS — M54.17 LUMBOSACRAL RADICULITIS: ICD-10-CM

## 2024-07-09 DIAGNOSIS — G89.4 CHRONIC PAIN SYNDROME: ICD-10-CM

## 2024-07-09 DIAGNOSIS — M54.16 LUMBAR RADICULITIS: Primary | ICD-10-CM

## 2024-07-09 PROCEDURE — 3017F COLORECTAL CA SCREEN DOC REV: CPT | Performed by: NURSE PRACTITIONER

## 2024-07-09 PROCEDURE — 99214 OFFICE O/P EST MOD 30 MIN: CPT | Performed by: NURSE PRACTITIONER

## 2024-07-09 PROCEDURE — 3079F DIAST BP 80-89 MM HG: CPT | Performed by: NURSE PRACTITIONER

## 2024-07-09 PROCEDURE — G8417 CALC BMI ABV UP PARAM F/U: HCPCS | Performed by: NURSE PRACTITIONER

## 2024-07-09 PROCEDURE — G8427 DOCREV CUR MEDS BY ELIG CLIN: HCPCS | Performed by: NURSE PRACTITIONER

## 2024-07-09 PROCEDURE — 1036F TOBACCO NON-USER: CPT | Performed by: NURSE PRACTITIONER

## 2024-07-09 PROCEDURE — 3074F SYST BP LT 130 MM HG: CPT | Performed by: NURSE PRACTITIONER

## 2024-07-09 RX ORDER — TIZANIDINE 4 MG/1
4 TABLET ORAL DAILY PRN
Qty: 90 TABLET | Refills: 0 | Status: SHIPPED | OUTPATIENT
Start: 2024-07-09 | End: 2024-10-07

## 2024-07-09 RX ORDER — FAMOTIDINE 20 MG/1
20 TABLET, FILM COATED ORAL DAILY
COMMUNITY
Start: 2024-06-28

## 2024-07-09 RX ORDER — OXYCODONE HYDROCHLORIDE AND ACETAMINOPHEN 5; 325 MG/1; MG/1
1 TABLET ORAL 2 TIMES DAILY PRN
Qty: 28 TABLET | Refills: 0 | Status: SHIPPED | OUTPATIENT
Start: 2024-07-09 | End: 2024-07-23

## 2024-07-09 ASSESSMENT — ENCOUNTER SYMPTOMS: BACK PAIN: 1

## 2024-07-09 NOTE — PATIENT INSTRUCTIONS
Patient Education        Learning About Lumbar Epidural Steroid Injections  What is a lumbar epidural steroid injection?     A lumbar epidural injection is a shot into the epidural space--the area in your back around the spinal cord. The shot may help reduce pain, tingling, or numbness in your back, buttock, or leg. The shot may have a steroid to reduce pain and swelling and a local anesthetic to numb nerves.  How is a lumbar epidural steroid injection done?  The doctor may use an imaging test before or during your injection. This can be an MRI, a CT scan, or an X-ray. These tests can show where your nerve problems are.  After finding the right spot, the doctor may inject a numbing medicine into the skin where you will get the steroid injection. Then the needle for the steroid is put into the numbed area. You may feel some pressure. You could feel some stinging or burning during the injection.  How long does an epidural steroid injection take?  The procedure will take 5 to 15 minutes. You will go home about an hour later.  What can you expect after a lumbar epidural steroid injection?  If your injection had local anesthetic and a steroid, your legs may feel heavy or numb right after. You will probably be able to walk. But you may need to be extra careful. Take care not to lose your balance, and be sure to follow your doctor's instructions.  If your injection contained local anesthetic, you may feel better right away. But this pain relief will last only a few hours. Your pain will probably return. This is because the steroids have not started working yet. Before the steroids start to work, your back may be sore for a few days.  These injections don't always work. When they do, it takes 1 to 5 days. This pain relief can last for several days to a few months or longer.  You may want to do less than normal for a few days. But you may also be able to return to your daily routine.  Some people are dizzy or feel sick to

## 2024-07-22 NOTE — DISCHARGE INSTRUCTIONS
ANESTHESIA INSTRUCTIONS FOLLOWING SURGERY        Since you may experience some intermittent light-headedness for the next several hours, we suggest you plan on bed rest or quiet relaxation this evening. You must have a friend or relative stay with you tonight.    Because of the sedation you have received, it is recommended that you do not drive a motor vehicle, operate any kind of machinery, or sign any contractual agreement for 24 hours following the procedure.    You should not take alcoholic beverages tonight and only take sleeping medication that has been specifically prescribed for you by your physician.  Call office [phone] if you have:  Temperature greater than 100.4  Persistent nausea and vomiting  Severe uncontrolled pain  Redness, tenderness, or signs of infection (pain, swelling, redness, odor or green/yellow discharge around the site)  Difficulty breathing, headache or visual disturbances  Hives  Persistent dizziness or light-headedness  Extreme fatigue  Any other questions or concerns you may have after discharge    In an emergency, call 911 or go to an Emergency Department at a nearby hospital    It is important to bring a complete, current list of your medications to any medical appointments or hospitalizations.    REMINDER:   Carry a list of your medications and allergies with you at all times  Call your pharmacy at least 1 week in advance to refill prescriptions    Diet: Resume your usual diet. Good nutrition promotes healing. Increase fluid intake.     Activity: Rest for 24 hrs then resume normal activity.    HOME MEDICATIONS:      If on blood thinning products such as; Aspirin, NSAIDS, Plavix, Coumadin, Xarelto, Fish Oil, Multi-Vitamins or Herbal Supplements restart in 24 hours      Restart Metformin in 48 hours if you had procedure with dye.      Restart Metformin in 24 hours if no dye used during procedure.        Education Materials Received: {yes/no:412074}  Belongings Returned:

## 2024-07-25 ENCOUNTER — HOSPITAL ENCOUNTER (OUTPATIENT)
Age: 49
Setting detail: OUTPATIENT SURGERY
Discharge: HOME | End: 2024-07-25
Attending: PAIN MEDICINE | Admitting: PAIN MEDICINE
Payer: COMMERCIAL

## 2024-07-25 ENCOUNTER — APPOINTMENT (OUTPATIENT)
Dept: GENERAL RADIOLOGY | Age: 49
End: 2024-07-25
Attending: PAIN MEDICINE
Payer: COMMERCIAL

## 2024-07-25 VITALS
RESPIRATION RATE: 16 BRPM | SYSTOLIC BLOOD PRESSURE: 137 MMHG | HEART RATE: 66 BPM | TEMPERATURE: 97.2 F | DIASTOLIC BLOOD PRESSURE: 77 MMHG | HEIGHT: 63 IN | BODY MASS INDEX: 42.21 KG/M2 | WEIGHT: 238.2 LBS | OXYGEN SATURATION: 94 %

## 2024-07-25 PROCEDURE — 7100000011 HC PHASE II RECOVERY - ADDTL 15 MIN: Performed by: PAIN MEDICINE

## 2024-07-25 PROCEDURE — 3600000054 HC PAIN LEVEL 3 BASE: Performed by: PAIN MEDICINE

## 2024-07-25 PROCEDURE — 2709999900 HC NON-CHARGEABLE SUPPLY: Performed by: PAIN MEDICINE

## 2024-07-25 PROCEDURE — 99152 MOD SED SAME PHYS/QHP 5/>YRS: CPT | Performed by: PAIN MEDICINE

## 2024-07-25 PROCEDURE — 6360000002 HC RX W HCPCS: Performed by: PAIN MEDICINE

## 2024-07-25 PROCEDURE — 7100000010 HC PHASE II RECOVERY - FIRST 15 MIN: Performed by: PAIN MEDICINE

## 2024-07-25 PROCEDURE — 2500000003 HC RX 250 WO HCPCS: Performed by: PAIN MEDICINE

## 2024-07-25 PROCEDURE — 2580000003 HC RX 258: Performed by: PAIN MEDICINE

## 2024-07-25 PROCEDURE — 6360000004 HC RX CONTRAST MEDICATION: Performed by: PAIN MEDICINE

## 2024-07-25 RX ORDER — LIDOCAINE HYDROCHLORIDE 20 MG/ML
INJECTION, SOLUTION INFILTRATION; PERINEURAL PRN
Status: DISCONTINUED | OUTPATIENT
Start: 2024-07-25 | End: 2024-07-25 | Stop reason: ALTCHOICE

## 2024-07-25 RX ORDER — MIDAZOLAM HYDROCHLORIDE 1 MG/ML
INJECTION INTRAMUSCULAR; INTRAVENOUS PRN
Status: DISCONTINUED | OUTPATIENT
Start: 2024-07-25 | End: 2024-07-25 | Stop reason: ALTCHOICE

## 2024-07-25 RX ORDER — FENTANYL CITRATE 50 UG/ML
INJECTION, SOLUTION INTRAMUSCULAR; INTRAVENOUS PRN
Status: DISCONTINUED | OUTPATIENT
Start: 2024-07-25 | End: 2024-07-25 | Stop reason: ALTCHOICE

## 2024-07-25 RX ORDER — SODIUM CHLORIDE 9 MG/ML
INJECTION, SOLUTION INTRAMUSCULAR; INTRAVENOUS; SUBCUTANEOUS PRN
Status: DISCONTINUED | OUTPATIENT
Start: 2024-07-25 | End: 2024-07-25 | Stop reason: ALTCHOICE

## 2024-07-25 RX ORDER — DEXAMETHASONE SODIUM PHOSPHATE 4 MG/ML
INJECTION, SOLUTION INTRA-ARTICULAR; INTRALESIONAL; INTRAMUSCULAR; INTRAVENOUS; SOFT TISSUE PRN
Status: DISCONTINUED | OUTPATIENT
Start: 2024-07-25 | End: 2024-07-25 | Stop reason: ALTCHOICE

## 2024-07-25 NOTE — OP NOTE
Pre-Procedure Note    Patient Name: Florecita Ibarra   YOB: 1975  Medical Record Number: [id number]  Date: 7/25/24       Indication:  L-radiculitis    Consent: On file.    Vital Signs:   Vitals:    07/25/24 1037   BP: (!) 170/87   Pulse: 64   Resp: 16   Temp: 97.4 °F (36.3 °C)   SpO2: 94%       Past Medical History:   has a past medical history of ADD (attention deficit disorder), Depression, Fibromyalgia, Headache(784.0), Hyperlipidemia, Hypertension, and Obesity.    Past Surgical History:   has a past surgical history that includes Hysterectomy; Pain management procedure (Bilateral, 5/26/2022); Pain management procedure (N/A, 3/25/2024); and Back Injection (Right, 5/1/2024).    Pre-Sedation Documentation and Exam:   Vital signs have been reviewed (see flow sheet for vitals).     Sedation/Anesthesia Plan:   IV sedation    Medications Planned:   Per Anesthesia.      Patient is an appropriate candidate for plan of sedation: yes    Preoperative Diagnosis:  L-radiculitis, L-spondylosis    Procedure Performed:  Lumbar epidural steroid injection under fluoroscopy guidance with IV sedation.    Indication for the Procedure:  The patient failed conservative management  for pain in the low back radiating to lower extremities.  The patient is undergoing lumbar epidural steroid injection.  As the patient is not responding to conservative management and it is interfering with activities of daily living we decided to proceed with lumbar epidural steroid injection. The procedure and risks were discussed with the patient and an informed consent was obtained    Procedure:    The patient is placed in prone position. Skin over the back was prepped and draped in sterile manner. Then using fluoroscopy the L5 interspace was observed and the skin and deep tissues in the right paramedian area were infiltrated with 6 ml of 2% lidocaine. The #20-gauge, 3-1/2 inch Tuohy needle was inserted through the skin wheal and the epidural

## 2024-07-25 NOTE — H&P
Degenerative changes of the lower lumbar spine are seen as described above.        PROCEDURE: XR HIP 2-3 VW W PELVIS RIGHT     CLINICAL INFORMATION: Weakness of right leg, Right hip pain     COMPARISON: No prior study.     TECHNIQUE: A single AP view of the pelvis was obtained in addition to AP and frog-leg views of the right hip.     FINDINGS: The hip joints, sacroiliac joints, and bones of the pelvis are intact. Normal abduction of right hip is demonstrated. There is a transitional lumbosacral vertebral body with a pseudoarthrosis on the left side.     IMPRESSION:  Normal pelvis and right hip.              She completed PT .US helped the most. She continues to have low back Hip knee pain .     Has had lumbar pain for many years. 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 On her feet a lot with bending heavy lifting standing at bench. She has seen Dr Jackson in past, She did not care for his care and never went back.  She denies any trauma  fall or MVC. She has had Endometriosis with hysterectomy with weight gain but had back pain prior to that.  She has constant lumbar pain  radiates into bilateral Hips SI and down RLE stops above the knee.    She was taking Baclofen interacted with Topamax for migraines, Mobic. Zomig didn't work for her migraines.  She complains of hip and knee pain also.   Patient pain increases with bending, lifting, twisting , turning torso, standing and housework or working at job.  Treatments tried ice, heat, Chiropractor, NSAIDS, muscle relaxer, OTC rubs creams patches and TENS  Pain description sharp and aching        She denies any ER visits or new health issues since last visit.     Pain scale with out pain medications or at its worst is 8/10.  Pain scale with pain medications or at its best is 4/10.     PRIOR INJECTIONS      LESI @ L5 right sided with  Fluoroscopy and IV Sedation  3/25/2024 states 50-60%  relief  for 3.5 months   F/U Lumbar Facet MBB#1 @ L4-5,5-S1 bilateral 5/26/2022

## 2024-07-25 NOTE — PROGRESS NOTES
1146: Patient to phase 2 recovery room via cart. Patient is awake and alert. Report received from surgical RN, Iris. Patient's vitals obtained, see charting.   1148: Patient is denying pain and nausea at this time. IV is INT'd.   1150: Offered drink and snack provided to the patient. Bed is in the lowest position and call light is within reach.   1201: Patient is resting in bed- no needs or complaints at this time. Call light remains within reach.   1207: Patient states she understood her discharge instructions given in pre op. IV removed at this time- no complications and dressing applied.   1215: Patient wheeled to the car and discharged home in stable condition with her mom.

## 2024-08-13 ENCOUNTER — OFFICE VISIT (OUTPATIENT)
Dept: PHYSICAL MEDICINE AND REHAB | Age: 49
End: 2024-08-13
Payer: COMMERCIAL

## 2024-08-13 VITALS
SYSTOLIC BLOOD PRESSURE: 138 MMHG | DIASTOLIC BLOOD PRESSURE: 84 MMHG | HEIGHT: 63 IN | WEIGHT: 238 LBS | BODY MASS INDEX: 42.17 KG/M2 | HEART RATE: 74 BPM

## 2024-08-13 DIAGNOSIS — M46.1 SI (SACROILIAC) JOINT INFLAMMATION (HCC): ICD-10-CM

## 2024-08-13 DIAGNOSIS — G89.4 CHRONIC PAIN SYNDROME: ICD-10-CM

## 2024-08-13 DIAGNOSIS — M54.16 LUMBAR RADICULITIS: Primary | ICD-10-CM

## 2024-08-13 DIAGNOSIS — M53.3 SACROILIAC JOINT DYSFUNCTION: ICD-10-CM

## 2024-08-13 DIAGNOSIS — M54.17 LUMBOSACRAL RADICULITIS: ICD-10-CM

## 2024-08-13 DIAGNOSIS — M47.816 LUMBAR SPONDYLOSIS: ICD-10-CM

## 2024-08-13 PROCEDURE — 3075F SYST BP GE 130 - 139MM HG: CPT | Performed by: NURSE PRACTITIONER

## 2024-08-13 PROCEDURE — 3079F DIAST BP 80-89 MM HG: CPT | Performed by: NURSE PRACTITIONER

## 2024-08-13 PROCEDURE — G8417 CALC BMI ABV UP PARAM F/U: HCPCS | Performed by: NURSE PRACTITIONER

## 2024-08-13 PROCEDURE — 99214 OFFICE O/P EST MOD 30 MIN: CPT | Performed by: NURSE PRACTITIONER

## 2024-08-13 PROCEDURE — 3017F COLORECTAL CA SCREEN DOC REV: CPT | Performed by: NURSE PRACTITIONER

## 2024-08-13 PROCEDURE — 1036F TOBACCO NON-USER: CPT | Performed by: NURSE PRACTITIONER

## 2024-08-13 PROCEDURE — G8427 DOCREV CUR MEDS BY ELIG CLIN: HCPCS | Performed by: NURSE PRACTITIONER

## 2024-08-13 RX ORDER — OXYCODONE HYDROCHLORIDE AND ACETAMINOPHEN 5; 325 MG/1; MG/1
1 TABLET ORAL 2 TIMES DAILY PRN
Qty: 90 TABLET | Refills: 0 | Status: SHIPPED | OUTPATIENT
Start: 2024-08-13 | End: 2024-09-12

## 2024-08-13 ASSESSMENT — ENCOUNTER SYMPTOMS: BACK PAIN: 1

## 2024-08-13 NOTE — PROGRESS NOTES
Impression   No fracture or acute bony abnormality noted in either knee. Chronic arthritic changes are discussed.             FINDINGS:        No fracture is identified. The bony mineralization is appropriate. The alignment is preserved. Mild bilateral tricompartmental joint space narrowing and marginal spurring is most pronounced in the patellofemoral compartment.       The visualized soft tissues are unremarkable.           Impression   No fracture or acute bony abnormality noted in either knee. Chronic arthritic changes are discussed.                 : There is normal osseous alignment without visualized fracture. Joint spaces appear preserved. No significant degenerative changes of the hips are identified.           Impression    Normal hip series.                      The patientis allergic to bactrim [sulfamethoxazole-trimethoprim], sulfur, and vicodin [hydrocodone-acetaminophen].      Subjective:      Review of Systems   Constitutional:  Positive for activity change.   Cardiovascular:         HTN CAD    Musculoskeletal:  Positive for back pain and gait problem.   Neurological:  Positive for headaches.   Psychiatric/Behavioral:          ADD       Objective:     Vitals:    08/13/24 1444   BP: 138/84   Site: Left Upper Arm   Position: Sitting   Cuff Size: Large Adult   Pulse: 74   Weight: 108 kg (238 lb)   Height: 1.6 m (5' 2.99\")         Physical Exam  Vitals and nursing note reviewed.   Constitutional:       General: She is not in acute distress.     Appearance: Normal appearance. She is well-developed. She is obese.   HENT:      Head: Normocephalic and atraumatic.      Right Ear: External ear normal.      Left Ear: External ear normal.      Nose: Nose normal.   Eyes:      Conjunctiva/sclera: Conjunctivae normal.      Pupils: Pupils are equal, round, and reactive to light.   Neck:      Thyroid: No thyromegaly.   Pulmonary:      Effort: Pulmonary effort is normal. No respiratory distress.   Musculoskeletal:

## 2024-08-26 NOTE — TELEPHONE ENCOUNTER
OARRS reviewed. UDS.   Last seen: 8/13/2024. Follow-up:   Future Appointments   Date Time Provider Department Center   10/29/2024  3:40 PM Emmy Whittington APRN - CNP [facility] [place]   12/26/2024  8:00 AM Saurav King MD [facility]

## 2024-10-28 ENCOUNTER — TELEPHONE (OUTPATIENT)
Dept: SLEEP CENTER | Age: 49
End: 2024-10-28

## 2024-10-28 NOTE — TELEPHONE ENCOUNTER
Could someone please reach out to Florecita?  She left a vm here at the sleep lab wanting to schedule her consult.      Thank you,  Kaya

## 2024-10-29 ENCOUNTER — OFFICE VISIT (OUTPATIENT)
Dept: PHYSICAL MEDICINE AND REHAB | Age: 49
End: 2024-10-29
Payer: COMMERCIAL

## 2024-10-29 VITALS
BODY MASS INDEX: 42.19 KG/M2 | SYSTOLIC BLOOD PRESSURE: 130 MMHG | DIASTOLIC BLOOD PRESSURE: 62 MMHG | WEIGHT: 238.1 LBS | HEIGHT: 63 IN

## 2024-10-29 DIAGNOSIS — M46.1 SI (SACROILIAC) JOINT INFLAMMATION (HCC): ICD-10-CM

## 2024-10-29 DIAGNOSIS — M54.16 LUMBAR RADICULITIS: ICD-10-CM

## 2024-10-29 DIAGNOSIS — G89.4 CHRONIC PAIN SYNDROME: ICD-10-CM

## 2024-10-29 DIAGNOSIS — M54.17 LUMBOSACRAL RADICULITIS: ICD-10-CM

## 2024-10-29 DIAGNOSIS — M53.3 SACROILIAC JOINT DYSFUNCTION: ICD-10-CM

## 2024-10-29 DIAGNOSIS — M47.816 LUMBAR SPONDYLOSIS: Primary | ICD-10-CM

## 2024-10-29 PROCEDURE — 3017F COLORECTAL CA SCREEN DOC REV: CPT | Performed by: NURSE PRACTITIONER

## 2024-10-29 PROCEDURE — 3078F DIAST BP <80 MM HG: CPT | Performed by: NURSE PRACTITIONER

## 2024-10-29 PROCEDURE — 1036F TOBACCO NON-USER: CPT | Performed by: NURSE PRACTITIONER

## 2024-10-29 PROCEDURE — G8427 DOCREV CUR MEDS BY ELIG CLIN: HCPCS | Performed by: NURSE PRACTITIONER

## 2024-10-29 PROCEDURE — G8417 CALC BMI ABV UP PARAM F/U: HCPCS | Performed by: NURSE PRACTITIONER

## 2024-10-29 PROCEDURE — 99214 OFFICE O/P EST MOD 30 MIN: CPT | Performed by: NURSE PRACTITIONER

## 2024-10-29 PROCEDURE — G8484 FLU IMMUNIZE NO ADMIN: HCPCS | Performed by: NURSE PRACTITIONER

## 2024-10-29 PROCEDURE — 3075F SYST BP GE 130 - 139MM HG: CPT | Performed by: NURSE PRACTITIONER

## 2024-10-29 RX ORDER — HYDROXYZINE HYDROCHLORIDE 10 MG/1
10 TABLET, FILM COATED ORAL NIGHTLY PRN
Qty: 30 TABLET | Refills: 0 | Status: SHIPPED | OUTPATIENT
Start: 2024-10-29 | End: 2024-11-28

## 2024-10-29 ASSESSMENT — ENCOUNTER SYMPTOMS: BACK PAIN: 1

## 2024-12-25 NOTE — PROGRESS NOTES
[facility]  Sleep Medicine Clinic initial consultation note    Florecita Ibarra                                                Chief complaint: Florecita Ibarra is a 49 y.o.oldfemale came for further evaluation regarding her ?sleep apnea  with referral from Yesenia Orona, APRN - [facility]        [place]:    Sleep/Wake schedule:  Usual time to go to bed during the work/regular day of week: 10 PM  Usual time to wake up during the work//regular day of week: 6:30 AM  Over the weekends her sleep schedule: [x] Remain same.   She usually falls a sleep in less than: 1 to 2 hours.  She usually takes 10 to 20 mg of melatonin for her insomnia as per her pain management specialist recommendation.  She was also prescribed with Elavil (amitriptyline 50 mg 1 tablet) nightly for her insomnia.  She can go to sleep if she do not take Elavil. However she takes a long time.  She used to take Ambien in the past.  She tried up to 20 mg of Ambien per night in the past.  However she used to have a sleep talking and unrefreshing sleep after Ambien use.  She quit taking Ambien.   She tried trazodone for her insomnia in the past.  She quit taking trazodone due to no improvement in her insomnia symptoms.  She takes naps: No.       Sleep Hygiene:  Is the temperature and evironment in her bed room is acceptable to her: Yes.   She watches Television in her bed room: Yes.  She has to watch TV before going to sleep.  She cannot sleep without watching TV.  She has to watch TV to calm down her brain.  She read books, study, pay bills etc in the bed: Yes.  She works with her smart phone before going to sleep  Frequency She wake up during night/sleep: 2-3 time  Majority of nocturnal awakenings are for urination: Yes.   Difficulty in falling back to sleep after nocturnal awakenings: Yes  .  Do you drink coffee: Yes.  She drinks 1 to 2 cups of coffee per day.  She drinks caffeinated coffee.  She tried to drink

## 2025-01-20 NOTE — PROGRESS NOTES
Chief Complaint:  Sleep Consult for Sleep Disorder and Sleep Difficulties     Mallampati airway Class:IV  Neck Circumference:15.25 Inches    Shelby sleepiness score 1/20/25: 10.  SAQLI:  12    Diagnostic Data: No previous sleep study  PAP Download:   Never on PAP therapy

## 2025-01-21 ENCOUNTER — TELEPHONE (OUTPATIENT)
Dept: PULMONOLOGY | Age: 50
End: 2025-01-21

## 2025-01-21 ENCOUNTER — OFFICE VISIT (OUTPATIENT)
Dept: PULMONOLOGY | Age: 50
End: 2025-01-21
Payer: COMMERCIAL

## 2025-01-21 VITALS
SYSTOLIC BLOOD PRESSURE: 100 MMHG | HEIGHT: 63 IN | TEMPERATURE: 98.2 F | BODY MASS INDEX: 40.93 KG/M2 | WEIGHT: 231 LBS | DIASTOLIC BLOOD PRESSURE: 74 MMHG | OXYGEN SATURATION: 96 % | HEART RATE: 71 BPM

## 2025-01-21 DIAGNOSIS — F51.04 CHRONIC INSOMNIA: ICD-10-CM

## 2025-01-21 DIAGNOSIS — G47.10 HYPERSOMNIA: Primary | ICD-10-CM

## 2025-01-21 DIAGNOSIS — R06.83 SNORING: ICD-10-CM

## 2025-01-21 PROCEDURE — 3078F DIAST BP <80 MM HG: CPT | Performed by: INTERNAL MEDICINE

## 2025-01-21 PROCEDURE — G8427 DOCREV CUR MEDS BY ELIG CLIN: HCPCS | Performed by: INTERNAL MEDICINE

## 2025-01-21 PROCEDURE — 1036F TOBACCO NON-USER: CPT | Performed by: INTERNAL MEDICINE

## 2025-01-21 PROCEDURE — G8417 CALC BMI ABV UP PARAM F/U: HCPCS | Performed by: INTERNAL MEDICINE

## 2025-01-21 PROCEDURE — 99204 OFFICE O/P NEW MOD 45 MIN: CPT | Performed by: INTERNAL MEDICINE

## 2025-01-21 PROCEDURE — 3017F COLORECTAL CA SCREEN DOC REV: CPT | Performed by: INTERNAL MEDICINE

## 2025-01-21 PROCEDURE — 3074F SYST BP LT 130 MM HG: CPT | Performed by: INTERNAL MEDICINE

## 2025-01-21 RX ORDER — ESZOPICLONE 2 MG/1
2 TABLET, FILM COATED ORAL NIGHTLY
Qty: 1 TABLET | Refills: 0 | Status: SHIPPED | OUTPATIENT
Start: 2025-01-21 | End: 2025-01-22

## 2025-01-21 RX ORDER — ESTRADIOL 0.5 MG/1
0.5 TABLET ORAL DAILY
COMMUNITY
Start: 2024-12-05

## 2025-01-21 NOTE — PATIENT INSTRUCTIONS
Recommendations/Plan:  -She was advised to decrease melatonin to 5 mg p.o. nightly. She was instructed to not to drive any motor vehicles or operate heavy equipment after taking the pill until sleep symptoms are under control. She was detailed about mechanism of action of drug along with associated side effects. She agreed to take the risk and medication.  -She was advised to decrease her a starter dose to 20 mg p.o. daily in a.m. to improve her insomnia.  -Schedule patient for Cognitive behavioral therapy consult with Dr. Francine Conteh as soon as possible for further evaluation of her chronic insomnia and cognitive behavioral therapy for insomia.  -She was educated about sleep restriction therapy and advised to practice to improve her insomnia.  -She was educated about stimulus control therapy and advise to practice to improve her insomnia.  -Will schedule patient for polysomnogram in the sleep lab.   -I had a discussion with patient regarding avialable treatment options for her sleep disorder breathing including but not limited to CPAP titration in the sleep lab Vs.Dental appliance placement with referral to a local dentist Vs other available surgical options including Uvulopalatopharyngoplasty, maxillomandibular ostomy,Inspire device placement and tracheostomy as last option. At the end of discussion, she is not decided on her   treatment if she found to have obstructive sleep apnea at this time.  -At the request of patient due to difficulty in sleeping in strange places ( including sleep lab), she was given a sleep aid i.e Lunesta 2mg PO Qhs prn X1 to use on the day of sleep study. She was instructed to take Lunesta only if needed on the day of test. She verbalizes understanding.  -We will see Florecita Ibarra back in 1week after the sleep study to go over the sleep study results and further management options.  -She was educated to practice good sleep hygiene practices. She was provided with a good sleep

## 2025-01-22 DIAGNOSIS — G47.10 HYPERSOMNIA: Primary | ICD-10-CM

## 2025-01-22 DIAGNOSIS — F51.04 CHRONIC INSOMNIA: ICD-10-CM

## 2025-01-22 DIAGNOSIS — R06.83 SNORING: ICD-10-CM

## 2025-01-22 NOTE — TELEPHONE ENCOUNTER
Pt was here yesterday and was asking about a HST vs In lab. Said she doesn't slee good when she travels and wanted to do home if possible? Please advise.

## 2025-01-22 NOTE — TELEPHONE ENCOUNTER
LM for Pt to give her the date of the HST.  is March 12th at 10am. Will need to R/S her F/U after also.

## 2025-03-12 ENCOUNTER — HOSPITAL ENCOUNTER (OUTPATIENT)
Dept: SLEEP CENTER | Age: 50
Discharge: HOME | End: 2025-03-14
Payer: COMMERCIAL

## 2025-03-12 DIAGNOSIS — G47.10 HYPERSOMNIA: ICD-10-CM

## 2025-03-12 DIAGNOSIS — R06.83 SNORING: ICD-10-CM

## 2025-03-12 DIAGNOSIS — F51.04 CHRONIC INSOMNIA: ICD-10-CM

## 2025-03-12 PROCEDURE — 95806 SLEEP STUDY UNATT&RESP EFFT: CPT

## 2025-03-12 NOTE — PROGRESS NOTES
Florecita presents today for a HST instruction and demonstration on unit # 4666. Questions were asked and answers given.  She was able to return demonstration and verbalized understanding.  The sleep center control room phone number was provided in case questions arise during the study. Informed patient to call 911 in case of an emergency.   She states she will return the unit tomorrow before 1000.                       Title:  Home Sleep Apnea Testing (HSAT)     Approved by:  John Welch MD        Approval Date:   March, 2024 Next Review:   March, 2026       Responsible Party:  Melinda Pratt  Institution/Entities Applies to:    [facility]    Policy Number:  None      Document Type:  Such as Guideline, Policy,   Policy & Procedure, or Procedure, Instructions   Manual:  Policy and Procedures     Section: IV  Policy Start Date: June, 2011       HOME SLEEP APNEA TESTING (HSAT)      PURPOSE: To ensure home sleep apnea testing (HSAT) conducted by the sleep facility adheres to the current AASM practice parameters, clinical practice guidelines, best practice and clinical guidelines in regard to the diagnosis of ARIA in adults.       POLICY:      HSAT is a method of recording certain parameters which will target and measure, minimally, heart rate, oxygen saturation, respiratory airflow, respiratory effort and snoring for the purpose of evaluating a patient for ARIA.       HSAT will be performed in conjunction with a comprehensive sleep evaluation by an appropriately licensed sleep facility medical staff member. All portable monitoring equipment will be FDA-approved and appropriately maintained to ensure patient safety and efficiency of the test.       PROCEDURE:      An order from a licensed sleep physician along with appropriate medical history documenting the indication for HSAT that complies with the AASM practice parameters.    All tests will be performed, and records will

## 2025-05-20 ENCOUNTER — OFFICE VISIT (OUTPATIENT)
Age: 50
End: 2025-05-20
Payer: COMMERCIAL

## 2025-05-20 VITALS
OXYGEN SATURATION: 95 % | TEMPERATURE: 98 F | SYSTOLIC BLOOD PRESSURE: 136 MMHG | WEIGHT: 230 LBS | DIASTOLIC BLOOD PRESSURE: 82 MMHG | BODY MASS INDEX: 40.75 KG/M2 | HEIGHT: 63 IN | HEART RATE: 76 BPM

## 2025-05-20 DIAGNOSIS — E66.01 OBESITY, MORBID, BMI 40.0-49.9 (HCC): ICD-10-CM

## 2025-05-20 DIAGNOSIS — G47.33 OSA (OBSTRUCTIVE SLEEP APNEA): Primary | ICD-10-CM

## 2025-05-20 DIAGNOSIS — G47.09 OTHER INSOMNIA: ICD-10-CM

## 2025-05-20 PROCEDURE — 3075F SYST BP GE 130 - 139MM HG: CPT

## 2025-05-20 PROCEDURE — 3017F COLORECTAL CA SCREEN DOC REV: CPT

## 2025-05-20 PROCEDURE — 99214 OFFICE O/P EST MOD 30 MIN: CPT

## 2025-05-20 PROCEDURE — G8427 DOCREV CUR MEDS BY ELIG CLIN: HCPCS

## 2025-05-20 PROCEDURE — 3079F DIAST BP 80-89 MM HG: CPT

## 2025-05-20 PROCEDURE — 1036F TOBACCO NON-USER: CPT

## 2025-05-20 PROCEDURE — G8417 CALC BMI ABV UP PARAM F/U: HCPCS

## 2025-05-20 RX ORDER — AMITRIPTYLINE HYDROCHLORIDE 100 MG/1
100 TABLET ORAL NIGHTLY
Qty: 90 TABLET | Refills: 0 | Status: SHIPPED | OUTPATIENT
Start: 2025-05-20 | End: 2025-08-18

## 2025-05-20 RX ORDER — SEMAGLUTIDE 1.34 MG/ML
INJECTION, SOLUTION SUBCUTANEOUS
COMMUNITY

## 2025-05-20 ASSESSMENT — ENCOUNTER SYMPTOMS
RHINORRHEA: 0
SHORTNESS OF BREATH: 0
COUGH: 0
SINUS PRESSURE: 0
SORE THROAT: 0
WHEEZING: 0
SINUS PAIN: 0

## 2025-05-20 NOTE — PROGRESS NOTES
pain, sneezing and sore throat.    Respiratory:  Negative for cough, shortness of breath and wheezing.    Cardiovascular:  Negative for chest pain, palpitations and leg swelling.   Musculoskeletal:  Positive for arthralgias, gait problem and myalgias.   Allergic/Immunologic: Negative for environmental allergies.        Exam   Vitals -  /82 (BP Site: Left Upper Arm, Patient Position: Sitting, BP Cuff Size: Medium Adult)   Pulse 76   Temp 98 °F (36.7 °C) (Infrared)   Ht 1.6 m (5' 3\")   Wt 104.3 kg (230 lb)   SpO2 95% Comment: noel  BMI 40.74 kg/m²    Body mass index is 40.74 kg/m².  Oxygen level - Room Air  Physical Exam  Vitals and nursing note reviewed.   Constitutional:       General: She is not in acute distress.     Appearance: She is well-developed. She is obese.   HENT:      Head: Normocephalic and atraumatic.      Right Ear: External ear normal.      Left Ear: External ear normal.      Mouth/Throat:      Mouth: Mucous membranes are moist.      Pharynx: Oropharynx is clear. No oropharyngeal exudate.   Eyes:      General:         Right eye: No discharge.         Left eye: No discharge.   Cardiovascular:      Rate and Rhythm: Normal rate.   Pulmonary:      Effort: Pulmonary effort is normal. No respiratory distress.   Musculoskeletal:      Cervical back: Neck supple.   Neurological:      General: No focal deficit present.      Mental Status: She is alert.   Psychiatric:         Mood and Affect: Mood normal.         Behavior: Behavior normal.         Thought Content: Thought content normal.         Judgment: Judgment normal.          Electronically signed by BRIAN Donnelly CNP on 5/20/2025 at 4:19 PM

## 2025-07-31 DIAGNOSIS — G47.09 OTHER INSOMNIA: ICD-10-CM

## 2025-07-31 RX ORDER — AMITRIPTYLINE HYDROCHLORIDE 100 MG/1
100 TABLET ORAL NIGHTLY
Qty: 90 TABLET | Refills: 3 | Status: SHIPPED | OUTPATIENT
Start: 2025-07-31

## 2025-07-31 NOTE — TELEPHONE ENCOUNTER
Received refill request for amitriptyline hcl 100mg. Medication was last ordered by murali miller. Medication was last ordered on 05.20.2025 with 0 refills.   Patient was last seen in the office 05.20.2025. Patient has a scheduled follow up 08.20.2025.   Medication needs to be sent to [facility].

## 2025-09-03 ENCOUNTER — OFFICE VISIT (OUTPATIENT)
Dept: PHYSICAL MEDICINE AND REHAB | Age: 50
End: 2025-09-03
Payer: COMMERCIAL

## 2025-09-03 ENCOUNTER — TELEPHONE (OUTPATIENT)
Dept: PHYSICAL MEDICINE AND REHAB | Age: 50
End: 2025-09-03

## 2025-09-03 VITALS
WEIGHT: 235 LBS | DIASTOLIC BLOOD PRESSURE: 70 MMHG | SYSTOLIC BLOOD PRESSURE: 132 MMHG | BODY MASS INDEX: 41.64 KG/M2 | HEIGHT: 63 IN

## 2025-09-03 DIAGNOSIS — M48.062 SPINAL STENOSIS OF LUMBAR REGION WITH NEUROGENIC CLAUDICATION: ICD-10-CM

## 2025-09-03 DIAGNOSIS — M53.3 SACROILIAC JOINT DYSFUNCTION: ICD-10-CM

## 2025-09-03 DIAGNOSIS — M47.816 LUMBAR SPONDYLOSIS: ICD-10-CM

## 2025-09-03 DIAGNOSIS — M46.1 SI (SACROILIAC) JOINT INFLAMMATION: ICD-10-CM

## 2025-09-03 DIAGNOSIS — G89.4 CHRONIC PAIN SYNDROME: ICD-10-CM

## 2025-09-03 DIAGNOSIS — M54.16 LUMBAR RADICULITIS: Primary | ICD-10-CM

## 2025-09-03 DIAGNOSIS — M54.17 LUMBOSACRAL RADICULITIS: ICD-10-CM

## 2025-09-03 PROCEDURE — 3017F COLORECTAL CA SCREEN DOC REV: CPT | Performed by: NURSE PRACTITIONER

## 2025-09-03 PROCEDURE — G8427 DOCREV CUR MEDS BY ELIG CLIN: HCPCS | Performed by: NURSE PRACTITIONER

## 2025-09-03 PROCEDURE — 3078F DIAST BP <80 MM HG: CPT | Performed by: NURSE PRACTITIONER

## 2025-09-03 PROCEDURE — 3075F SYST BP GE 130 - 139MM HG: CPT | Performed by: NURSE PRACTITIONER

## 2025-09-03 PROCEDURE — G8417 CALC BMI ABV UP PARAM F/U: HCPCS | Performed by: NURSE PRACTITIONER

## 2025-09-03 PROCEDURE — 1036F TOBACCO NON-USER: CPT | Performed by: NURSE PRACTITIONER

## 2025-09-03 PROCEDURE — 99214 OFFICE O/P EST MOD 30 MIN: CPT | Performed by: NURSE PRACTITIONER

## 2025-09-03 ASSESSMENT — ENCOUNTER SYMPTOMS: BACK PAIN: 1

## (undated) DEVICE — GLOVE ORANGE PI 7 1/2   MSG9075

## (undated) DEVICE — 6 ML SYRINGE LUER-LOCK TIP: Brand: MONOJECT

## (undated) DEVICE — GAUZE SPONGES,USP TYPE VII GAUZE, 12 PLY: Brand: CURITY

## (undated) DEVICE — 3 ML SYRINGE LUER-LOCK TIP: Brand: MONOJECT

## (undated) DEVICE — HYPODERMIC SAFETY NEEDLE: Brand: MAGELLAN

## (undated) DEVICE — BANDAGE ADH W1XL3IN NAT FAB WVN FLX DURABLE N ADH PD SEAL

## (undated) DEVICE — TOWEL,OR,DSP,ST,BLUE,STD,4/PK,20PK/CS: Brand: MEDLINE

## (undated) DEVICE — SYRINGE MED 10ML LUERLOCK TIP W/O SFTY DISP

## (undated) DEVICE — CHLORAPREP 26ML CLEAR

## (undated) DEVICE — SHEET,DRAPE,3/4,53X77,STERILE: Brand: MEDLINE

## (undated) DEVICE — Z DISCONTINUED USE 2859171 TRAY NRV BLK PARACERVICAL PUDEN W/ 10ML CTRL SYR

## (undated) DEVICE — NEEDLE SYR 18GA L1.5IN RED PLAS HUB S STL BLNT FILL W/O

## (undated) DEVICE — NEEDLE SPNL 22GA L3.5IN BLK HUB S STL REG WALL FIT STYL

## (undated) DEVICE — NEEDLE SPNL 22GA L3.5IN BLK HUB S STL REG WALL FIT STYL W/